# Patient Record
Sex: MALE | Race: WHITE | NOT HISPANIC OR LATINO | Employment: OTHER | URBAN - METROPOLITAN AREA
[De-identification: names, ages, dates, MRNs, and addresses within clinical notes are randomized per-mention and may not be internally consistent; named-entity substitution may affect disease eponyms.]

---

## 2021-11-17 ENCOUNTER — NURSE TRIAGE (OUTPATIENT)
Dept: NURSING | Facility: CLINIC | Age: 86
End: 2021-11-17
Payer: COMMERCIAL

## 2021-11-18 NOTE — TELEPHONE ENCOUNTER
"TRIAGE CALL     Granddarohit Manriquez calling -     He has urgency with urination   was seen yesterday and he does not have a UTI   then she said \"doctor was useless\"  Clinic and  is  [facility]      She tells this RN that He has a Dx of an enlarged Prostate and has been taken Flomax for years  Last [employer] his manufacture for flomax change, and now he is having urinary symptoms    Symptoms/concern started 4 days    Pt only urinating a few drops on a bed pan  He does not make it to the bathroom because is \"too frequent\" -  He does not drink much water to avoid going to the bathroom.     Per protocol, disposition to ER now   RN unsure if granddaughter will follow dispositions she does not want to wait for hrs.  Explained the dangers of waiting and lte her know that if he is \"too weak\" he she can call 911.    Shiloh Rolle RN Nurse Triage Advisor 8:54 PM 11/17/2021     Reason for Disposition    [1] Unable to urinate (or only a few drops) > 4 hours AND     [2] bladder feels very full (e.g., palpable bladder or strong urge to urinate)    Additional Information    Negative: Shock suspected (e.g., cold/pale/clammy skin, too weak to stand, low BP, rapid pulse)    Negative: Sounds like a life-threatening emergency to the triager    Protocols used: URINARY SYMPTOMS-A-AH      "

## 2021-11-26 ENCOUNTER — LAB REQUISITION (OUTPATIENT)
Dept: LAB | Facility: CLINIC | Age: 86
End: 2021-11-26

## 2021-11-26 DIAGNOSIS — D64.9 ANEMIA, UNSPECIFIED: ICD-10-CM

## 2021-11-30 ENCOUNTER — LAB REQUISITION (OUTPATIENT)
Dept: LAB | Facility: CLINIC | Age: 86
End: 2021-11-30
Payer: COMMERCIAL

## 2021-11-30 DIAGNOSIS — I10 ESSENTIAL (PRIMARY) HYPERTENSION: ICD-10-CM

## 2021-11-30 DIAGNOSIS — D64.9 ANEMIA, UNSPECIFIED: ICD-10-CM

## 2021-11-30 LAB
ANION GAP SERPL CALCULATED.3IONS-SCNC: 11 MMOL/L (ref 5–18)
BUN SERPL-MCNC: 28 MG/DL (ref 8–28)
CALCIUM SERPL-MCNC: 8.8 MG/DL (ref 8.5–10.5)
CHLORIDE BLD-SCNC: 107 MMOL/L (ref 98–107)
CO2 SERPL-SCNC: 21 MMOL/L (ref 22–31)
CREAT SERPL-MCNC: 1.04 MG/DL (ref 0.7–1.3)
GFR SERPL CREATININE-BSD FRML MDRD: 63 ML/MIN/1.73M2
GLUCOSE BLD-MCNC: 142 MG/DL (ref 70–125)
HGB BLD-MCNC: 10.1 G/DL (ref 13.3–17.7)
POTASSIUM BLD-SCNC: 3.5 MMOL/L (ref 3.5–5)
SODIUM SERPL-SCNC: 139 MMOL/L (ref 136–145)

## 2021-11-30 PROCEDURE — P9603 ONE-WAY ALLOW PRORATED MILES: HCPCS | Mod: ORL | Performed by: NURSE PRACTITIONER

## 2021-11-30 PROCEDURE — 80048 BASIC METABOLIC PNL TOTAL CA: CPT | Mod: ORL | Performed by: NURSE PRACTITIONER

## 2021-11-30 PROCEDURE — 36415 COLL VENOUS BLD VENIPUNCTURE: CPT | Mod: ORL | Performed by: NURSE PRACTITIONER

## 2021-11-30 PROCEDURE — 85018 HEMOGLOBIN: CPT | Mod: ORL | Performed by: NURSE PRACTITIONER

## 2024-05-21 ENCOUNTER — DOCUMENTATION ONLY (OUTPATIENT)
Dept: GERIATRICS | Facility: CLINIC | Age: 89
End: 2024-05-21
Payer: COMMERCIAL

## 2024-05-21 PROBLEM — C78.7 CHOLANGIOCARCINOMA METASTATIC TO LIVER (H): Status: ACTIVE | Noted: 2021-04-08

## 2024-05-21 PROBLEM — R06.02 SOB (SHORTNESS OF BREATH): Status: ACTIVE | Noted: 2024-05-11

## 2024-05-21 PROBLEM — J69.0 ASPIRATION PNEUMONIA OF RIGHT LOWER LOBE (H): Status: ACTIVE | Noted: 2024-05-13

## 2024-05-21 PROBLEM — S72.143A CLOSED INTERTROCHANTERIC FRACTURE OF FEMUR (H): Status: ACTIVE | Noted: 2021-11-19

## 2024-05-21 PROBLEM — I48.0 PAROXYSMAL ATRIAL FIBRILLATION (H): Status: ACTIVE | Noted: 2021-11-23

## 2024-05-21 PROBLEM — R15.9 URINARY AND FECAL INCONTINENCE: Status: ACTIVE | Noted: 2022-12-01

## 2024-05-21 PROBLEM — D64.9 SEVERE ANEMIA: Status: ACTIVE | Noted: 2024-05-10

## 2024-05-21 PROBLEM — R41.82 ALTERED MENTAL STATUS: Status: ACTIVE | Noted: 2024-05-11

## 2024-05-21 PROBLEM — R47.89 OTHER SPEECH DISTURBANCES: Status: ACTIVE | Noted: 2021-06-04

## 2024-05-21 PROBLEM — D50.9 IRON DEFICIENCY ANEMIA: Status: ACTIVE | Noted: 2023-11-11

## 2024-05-21 PROBLEM — E78.5 HYPERLIPIDEMIA, UNSPECIFIED: Status: ACTIVE | Noted: 2021-11-23
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
## 2024-05-21 PROBLEM — K43.2 INCISIONAL HERNIA, WITHOUT OBSTRUCTION OR GANGRENE: Status: ACTIVE | Noted: 2023-02-11

## 2024-05-21 PROBLEM — E78.5 DYSLIPIDEMIA: Status: ACTIVE | Noted: 2021-05-03

## 2024-05-21 PROBLEM — R20.2 PARESTHESIA OF RIGHT FOOT: Status: ACTIVE | Noted: 2022-11-23

## 2024-05-21 PROBLEM — M62.81 GENERALIZED MUSCLE WEAKNESS: Status: ACTIVE | Noted: 2022-12-01

## 2024-05-21 PROBLEM — R41.0 DISORIENTATION: Status: ACTIVE | Noted: 2022-12-01

## 2024-05-21 PROBLEM — I45.5 OTHER SPECIFIED HEART BLOCK: Status: ACTIVE | Noted: 2021-11-23

## 2024-05-21 PROBLEM — R52 PAIN, UNSPECIFIED: Status: ACTIVE | Noted: 2021-11-23

## 2024-05-21 PROBLEM — Q38.7: Status: ACTIVE | Noted: 2018-06-08

## 2024-05-21 PROBLEM — C22.1 CHOLANGIOCARCINOMA METASTATIC TO LIVER (H): Status: ACTIVE | Noted: 2021-04-08

## 2024-05-21 PROBLEM — R53.1 WEAKNESS: Status: ACTIVE | Noted: 2021-11-23

## 2024-05-21 PROBLEM — I45.5 SINUS PAUSE: Status: ACTIVE | Noted: 2021-08-29

## 2024-05-21 PROBLEM — R13.13 DYSPHAGIA, PHARYNGEAL PHASE: Status: ACTIVE | Noted: 2021-11-23

## 2024-05-21 PROBLEM — G93.41 METABOLIC ENCEPHALOPATHY: Status: ACTIVE | Noted: 2024-05-11

## 2024-05-21 PROBLEM — R13.13 DYSPHAGIA, CRICOPHARYNGEAL: Status: ACTIVE | Noted: 2018-05-25

## 2024-05-21 PROBLEM — R32 URINARY AND FECAL INCONTINENCE: Status: ACTIVE | Noted: 2022-12-01

## 2024-05-21 PROBLEM — R00.1 BRADYCARDIA: Status: ACTIVE | Noted: 2021-06-04

## 2024-05-21 PROBLEM — N40.0 BENIGN PROSTATIC HYPERPLASIA WITHOUT LOWER URINARY TRACT SYMPTOMS: Status: ACTIVE | Noted: 2021-05-03

## 2024-05-21 PROBLEM — J81.1 NON-CARDIOGENIC PULMONARY EDEMA: Status: ACTIVE | Noted: 2024-05-10

## 2024-05-21 PROBLEM — M79.604 ACUTE PAIN OF RIGHT LOWER EXTREMITY: Status: ACTIVE | Noted: 2023-11-10

## 2024-05-22 ENCOUNTER — NURSING HOME VISIT (OUTPATIENT)
Dept: GERIATRICS | Facility: CLINIC | Age: 89
End: 2024-05-22
Payer: COMMERCIAL

## 2024-05-22 VITALS
RESPIRATION RATE: 16 BRPM | HEIGHT: 64 IN | WEIGHT: 137.8 LBS | BODY MASS INDEX: 23.52 KG/M2 | OXYGEN SATURATION: 92 % | HEART RATE: 65 BPM | TEMPERATURE: 98.1 F | SYSTOLIC BLOOD PRESSURE: 155 MMHG | DIASTOLIC BLOOD PRESSURE: 69 MMHG

## 2024-05-22 DIAGNOSIS — M62.81 GENERALIZED MUSCLE WEAKNESS: ICD-10-CM

## 2024-05-22 DIAGNOSIS — I48.0 PAROXYSMAL ATRIAL FIBRILLATION (H): ICD-10-CM

## 2024-05-22 DIAGNOSIS — D64.9 ANEMIA, UNSPECIFIED TYPE: ICD-10-CM

## 2024-05-22 DIAGNOSIS — C78.7 CHOLANGIOCARCINOMA METASTATIC TO LIVER (H): Primary | ICD-10-CM

## 2024-05-22 DIAGNOSIS — R00.1 BRADYCARDIA: ICD-10-CM

## 2024-05-22 DIAGNOSIS — C22.1 CHOLANGIOCARCINOMA METASTATIC TO LIVER (H): Primary | ICD-10-CM

## 2024-05-22 DIAGNOSIS — I73.9 PERIPHERAL VASCULAR DISEASE (H): ICD-10-CM

## 2024-05-22 PROCEDURE — 99310 SBSQ NF CARE HIGH MDM 45: CPT | Performed by: NURSE PRACTITIONER

## 2024-05-22 RX ORDER — MECOBALAMIN 5000 MCG
15 TABLET,DISINTEGRATING ORAL DAILY
COMMUNITY
Start: 2024-05-22

## 2024-05-22 RX ORDER — FINASTERIDE 5 MG/1
5 TABLET, FILM COATED ORAL DAILY
COMMUNITY
Start: 2024-05-22

## 2024-05-22 RX ORDER — ONDANSETRON 4 MG/1
4 TABLET, ORALLY DISINTEGRATING ORAL EVERY 8 HOURS PRN
COMMUNITY
Start: 2024-05-22 | End: 2024-07-03

## 2024-05-22 RX ORDER — POLYETHYLENE GLYCOL 3350 17 G/17G
1 POWDER, FOR SOLUTION ORAL DAILY PRN
COMMUNITY
Start: 2024-05-22 | End: 2024-07-03

## 2024-05-22 RX ORDER — LIDOCAINE 4 G/G
1 PATCH TOPICAL EVERY 24 HOURS
COMMUNITY
Start: 2024-05-22 | End: 2024-05-23

## 2024-05-22 RX ORDER — FERROUS SULFATE 325(65) MG
325 TABLET ORAL DAILY
COMMUNITY
Start: 2024-05-22

## 2024-05-22 RX ORDER — SENNOSIDES 8.6 MG
1 TABLET ORAL 2 TIMES DAILY PRN
COMMUNITY
Start: 2024-05-22 | End: 2024-07-03

## 2024-05-22 RX ORDER — OXYCODONE HYDROCHLORIDE 5 MG/1
2.5 TABLET ORAL EVERY 4 HOURS PRN
COMMUNITY
Start: 2024-05-22 | End: 2024-05-23

## 2024-05-22 RX ORDER — TAMSULOSIN HYDROCHLORIDE 0.4 MG/1
0.8 CAPSULE ORAL DAILY
COMMUNITY
Start: 2024-05-22

## 2024-05-22 RX ORDER — CLOPIDOGREL BISULFATE 75 MG/1
75 TABLET ORAL DAILY
COMMUNITY
Start: 2024-05-22

## 2024-05-22 RX ORDER — AMLODIPINE BESYLATE 5 MG/1
5 TABLET ORAL DAILY
COMMUNITY
Start: 2024-05-22 | End: 2024-07-03

## 2024-05-22 RX ORDER — ACETAMINOPHEN 325 MG/1
650 TABLET ORAL EVERY 4 HOURS PRN
COMMUNITY
Start: 2024-05-22 | End: 2024-07-03

## 2024-05-22 NOTE — PROGRESS NOTES
[facility] GERIATRICS  Primary Care Provider & Clinic: Kelsy Tavera NP, [address] [facility] [address]  Chief Complaint   Patient presents with    [facility] F/U     [facility] 5/17/2024 - 5/21/2024     [place] Medical Record Number: [id number]  Place of Service Where Encounter Took Place: [facility] () [[id number]]    Bc Seth is a 89 year old (7/9/1932), admitted to the above facility from  [facility]. Hospital stay 5/17/24 through 5/21/24.    HPI:    Patient was treated at the hospital on 5/9 for aspiration pneumonia he was able to recover and go home.  But was weak and went back to the hospital where he stayed from 5/17 through 5/21.  His hospital stay was complicated by bradycardia in the 40s.  An order was placed for an outpatient Zio patch x 14 days.  Goals of care were discussed and hospice services were offered.  He was also started on oxycodone and lidocaine patches as needed for pain.  Nurse reports that patient's granddaughter does not want him to have the oxycodone due to increased falls and confusion.    Facility EHR reviewed including bm record, progress notes, vital signs and MAR. Hospital discharge notes reviewed for last two admissions.     CODE STATUS/ADVANCE DIRECTIVES DISCUSSION: DNR/DNI  Patient's living condition: lives in a skilled nursing facility  ALLERGIES: No Known Allergies   PAST MEDICAL HISTORY: No past medical history on file.   PAST SURGICAL HISTORY:  has a past surgical history that includes IR Abdominal Aortogram (11/11/2023).  FAMILY HISTORY: family history is not on file.  SOCIAL HISTORY:      Post Discharge Medication Reconciliation Status:  MED REC REQUIRED  Post Medication Reconciliation Status:       Current Outpatient Medications   Medication Sig Dispense Refill    acetaminophen (TYLENOL) 325 MG tablet Take 650 mg by mouth every 4 hours as needed for pain      amLODIPine (NORVASC) 5 MG  "tablet Take 5 mg by mouth daily      clopidogrel (PLAVIX) 75 MG tablet Take 75 mg by mouth daily      ferrous sulfate (FEROSUL) 325 (65 Fe) MG tablet Take 325 mg by mouth daily      finasteride (PROSCAR) 5 MG tablet Take 5 mg by mouth daily      LANsoprazole (PREVACID) 15 MG DR capsule Take 15 mg by mouth daily      ondansetron (ZOFRAN ODT) 4 MG ODT tab Take 4 mg by mouth every 8 hours as needed for nausea or vomiting      polyethylene glycol (MIRALAX) 17 g packet Take 1 packet by mouth daily as needed for constipation      sennosides (SENOKOT) 8.6 MG tablet Take 1 tablet by mouth 2 times daily as needed for constipation      tamsulosin (FLOMAX) 0.4 MG capsule Take 0.8 mg by mouth daily       No current facility-administered medications for this visit.     ROS:  4 point ROS including Respiratory, CV, GI and , other than that noted in the HPI,  is negative    Vitals:  BP (!) 155/69   Pulse 65   Temp 98.1  F (36.7  C)   Resp 16   Ht 1.626 m (5' 4\")   Wt 62.5 kg (137 lb 12.8 oz)   SpO2 92%   BMI 23.65 kg/m    Exam:  GENERAL APPEARANCE:  Alert, in no distress  RESP:  respiratory effort normal  CV:   edema none  M/S:  Gait and station ambulates independently with walker, no tenderness or swelling of the joints   SKIN:  Inspection and palpation of skin and subcutaneous tissue at baseline  PSYCH:  insight and judgement, memory FLAQUITO, affect and mood normal    Hgb 9.5    ASSESSMENT/PLAN:  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 No symptoms of pain. Discontinue oxycodone at family request.     Bradycardia  No fall or syncopal episodes since admit.   - ziopatch as ordered.     Paroxysmal atrial fibrillation (H)  Noted in hx. Not on any rate controller medications. On plavix for PVD.     Peripheral vascular disease (H24)  No current concerns. Continue plavix.     Anemia, unspecified type  Noted with hx of recent significant anemia down to " 6.9 in early march requiring blood transfusion.  - monitor PRN.       Orders:  -Discontinue lidocaine patch  Discontinue oxycodone    45 MINUTES SPENT BY ME on the date of service doing chart review, history, exam, documentation & further activities per the note.     Electronically signed by: Kelsy Tavera NP

## 2024-05-22 NOTE — LETTER
5/22/2024        RE: Bc Seth  [address]  [facility] [address]        [facility] GERIATRICS  Primary Care Provider & Clinic: Kelsy Tavera NP, [address] [facility] [address]  Chief Complaint   Patient presents with     [facility] F/U     [facility] 5/17/2024 - 5/21/2024     [place] Medical Record Number: [id number]  Place of Service Where Encounter Took Place: [facility] () [[id number]]    Bc Seth is a 89 year old (7/9/1932), admitted to the above facility from  [facility]. Hospital stay 5/17/24 through 5/21/24.    HPI:    Patient was treated at the hospital on 5/9 for aspiration pneumonia he was able to recover and go home.  But was weak and went back to the hospital where he stayed from 5/17 through 5/21.  His hospital stay was complicated by bradycardia in the 40s.  An order was placed for an outpatient Zio patch x 14 days.  Goals of care were discussed and hospice services were offered.  He was also started on oxycodone and lidocaine patches as needed for pain.  Nurse reports that patient's granddaughter does not want him to have the oxycodone due to increased falls and confusion.    CODE STATUS/ADVANCE DIRECTIVES DISCUSSION: DNR/DNI  Patient's living condition: lives in a skilled nursing facility  ALLERGIES: No Known Allergies   PAST MEDICAL HISTORY: No past medical history on file.   PAST SURGICAL HISTORY:  has a past surgical history that includes IR Abdominal Aortogram (11/11/2023).  FAMILY HISTORY: family history is not on file.  SOCIAL HISTORY:      Post Discharge Medication Reconciliation Status:  MED REC REQUIRED  Post Medication Reconciliation Status:       Current Outpatient Medications   Medication Sig Dispense Refill     acetaminophen (TYLENOL) 325 MG tablet Take 650 mg by mouth every 4 hours as needed for pain       amLODIPine (NORVASC) 5 MG tablet Take 5 mg by mouth daily       clopidogrel (PLAVIX)  "75 MG tablet Take 75 mg by mouth daily       ferrous sulfate (FEROSUL) 325 (65 Fe) MG tablet Take 325 mg by mouth daily       finasteride (PROSCAR) 5 MG tablet Take 5 mg by mouth daily       LANsoprazole (PREVACID) 15 MG DR capsule Take 15 mg by mouth daily       ondansetron (ZOFRAN ODT) 4 MG ODT tab Take 4 mg by mouth every 8 hours as needed for nausea or vomiting       polyethylene glycol (MIRALAX) 17 g packet Take 1 packet by mouth daily as needed for constipation       sennosides (SENOKOT) 8.6 MG tablet Take 1 tablet by mouth 2 times daily as needed for constipation       tamsulosin (FLOMAX) 0.4 MG capsule Take 0.8 mg by mouth daily       No current facility-administered medications for this visit.     ROS:  4 point ROS including Respiratory, CV, GI and , other than that noted in the HPI,  is negative    Vitals:  BP (!) 155/69   Pulse 65   Temp 98.1  F (36.7  C)   Resp 16   Ht 1.626 m (5' 4\")   Wt 62.5 kg (137 lb 12.8 oz)   SpO2 92%   BMI 23.65 kg/m    Exam:  GENERAL APPEARANCE:  Alert, in no distress  RESP:  respiratory effort normal  CV:   edema none  M/S:  Gait and station ambulates independently with walker, no tenderness or swelling of the joints   SKIN:  Inspection and palpation of skin and subcutaneous tissue at baseline  PSYCH:  insight and judgement, memory FLAQUITO, affect and mood normal    Hgb 9.5    ASSESSMENT/PLAN:  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 No symptoms of pain. Discontinue oxycodone at family request.     Bradycardia  No fall or syncopal episodes since admit.   - ziopatch as ordered.     Paroxysmal atrial fibrillation (H)  Noted in hx. Not on any rate controller medications. On plavix for PVD.     Peripheral vascular disease (H24)  No current concerns. Continue plavix.     Anemia, unspecified type  Noted with hx of recent significant anemia down to 6.9 in early march requiring blood transfusion.  - " monitor PRN.       Orders:  -Discontinue lidocaine patch  Discontinue oxycodone    45 MINUTES SPENT BY ME on the date of service doing chart review, history, exam, documentation & further activities per the note.     Electronically signed by: Kelsy Tavera NP      Sincerely,        Kelsy Tavera NP

## 2024-05-23 PROBLEM — D64.9 ANEMIA, UNSPECIFIED TYPE: Status: ACTIVE | Noted: 2024-05-23

## 2024-05-30 NOTE — PROGRESS NOTES
"[facility] GERIATRICS    PRIMARY CARE PROVIDER AND CLINIC:  Kelsy Tavera NP, [address] [facility] [address]  Chief Complaint   Patient presents with    [facility] Medical Record Number:  [id number]  Place of Service where encounter took place:  [facility] () [[id number]]    Bc Seth  is a 89 year old  (7/9/1932), admitted to the above facility from  [facility]. Hospital stay 5/17/24 through 5/21/24..   HPI:    As per GNP's HPI:\"Patient was treated at the hospital on 5/9 for aspiration pneumonia he was able to recover and go home.  But was weak and went back to the hospital where he stayed from 5/17 through 5/21.  His hospital stay was complicated by bradycardia in the 40s.  An order was placed for an outpatient Zio patch x 14 days.  Goals of care were discussed and hospice services were offered.  He was also started on oxycodone and lidocaine patches as needed for pain. Nurse reports that patient's granddaughter does not want him to have the oxycodone due to increased falls and confusion.\"      TODAY:  -Cholangiocarcinoma metastatic to liver: nursing reports tolerating meal, had a good breakfast, often skips lunch.   -Pain:no concern reported by the staff  - General weakness/rehab: requires belt gait for transfer. RN reports does interact and comes out of his room.   - was able to help him sit up and drink water, then went back to bed.   =============================================================================================  CODE STATUS/ADVANCE DIRECTIVES DISCUSSION:  No CPR- Pre-arrest intubation OK    ALLERGIES: No Known Allergies   PAST MEDICAL HISTORY: No past medical history on file.   PAST SURGICAL HISTORY:   has a past surgical history that includes IR Abdominal Aortogram (11/11/2023).  FAMILY HISTORY: family history is not on file.  SOCIAL HISTORY:     Patient's living condition: lives with family    Post Discharge Medication " "Reconciliation Status:   MED REC REQUIRED  Post Medication Reconciliation Status: discharge medications reconciled and changed, per note/orders       Current Outpatient Medications   Medication Sig Dispense Refill    acetaminophen (TYLENOL) 325 MG tablet Take 650 mg by mouth every 4 hours as needed for pain      amLODIPine (NORVASC) 5 MG tablet Take 5 mg by mouth daily      clopidogrel (PLAVIX) 75 MG tablet Take 75 mg by mouth daily      ferrous sulfate (FEROSUL) 325 (65 Fe) MG tablet Take 325 mg by mouth daily      finasteride (PROSCAR) 5 MG tablet Take 5 mg by mouth daily      LANsoprazole (PREVACID) 15 MG DR capsule Take 15 mg by mouth daily      ondansetron (ZOFRAN ODT) 4 MG ODT tab Take 4 mg by mouth every 8 hours as needed for nausea or vomiting      polyethylene glycol (MIRALAX) 17 g packet Take 1 packet by mouth daily as needed for constipation      sennosides (SENOKOT) 8.6 MG tablet Take 1 tablet by mouth 2 times daily as needed for constipation      tamsulosin (FLOMAX) 0.4 MG capsule Take 0.8 mg by mouth daily       No current facility-administered medications for this visit.       ROS:  10 point ROS of systems including Constitutional, Eyes, Respiratory, Cardiovascular, Gastroenterology, Genitourinary, Integumentary, Musculoskeletal, Psychiatric were all negative except for pertinent positives noted in my HPI.    Vitals:  BP (!) 151/73   Pulse 55   Temp 98.6  F (37  C)   Resp 18   Ht 1.626 m (5' 4\")   Wt 58.2 kg (128 lb 4.8 oz)   SpO2 95%   BMI 22.02 kg/m    Exam:  GENERAL APPEARANCE:  in no distress, lying in the bed comfortable. Was able to sit up with my help to drink water, then went back to lay down. Offered him a sweet that was next to him, and ate a small piece only.   RESP:  Unlabored breathing. CTA b/l.   CV:  S1S2 audible, regular HR, no murmur appreciated.   ABDOMEN:  soft, NT/ND, BS audible.   M/S:   no joint deformity noted on observation.   SKIN:  No rash noted on observation  NEURO:  " " No NFD appreciated on observation.       Lab/Diagnostic data: Reviewed in the chart and EHR.      ASSESSMENT/PLAN:  =================  Cholangiocarcinoma metastatic to liver (H)  -\"Liver mets s/p partial resection (2021)  Status post Y90 radio embolization of the hepatic segment 4A lesion in May 2022, followed by Y90 radioembolization of the hepatic segment 7 lesion and stereotactic radiation. \"  - oncology /palliative care teams follow up    Primary hypertension  Bradycardia  - BP within acceptable range.   - not on rate control      Iron deficiency anemia, unspecified iron deficiency anemia type  - on supplement. continue    Slow transit constipation  - on meds. No concern, continue.     Benign prostatic hyperplasia without lower urinary tract symptoms  On flomax and finasteride. continue    Generalized muscle weakness  - started rehab program.       Orders:  NNO    Electronically signed by:  Gracia Khan MD                 "

## 2024-06-03 ENCOUNTER — NURSING HOME VISIT (OUTPATIENT)
Dept: GERIATRICS | Facility: CLINIC | Age: 89
End: 2024-06-03
Payer: COMMERCIAL

## 2024-06-03 VITALS
DIASTOLIC BLOOD PRESSURE: 73 MMHG | TEMPERATURE: 98.6 F | HEART RATE: 55 BPM | HEIGHT: 64 IN | BODY MASS INDEX: 21.91 KG/M2 | OXYGEN SATURATION: 95 % | RESPIRATION RATE: 18 BRPM | WEIGHT: 128.3 LBS | SYSTOLIC BLOOD PRESSURE: 151 MMHG

## 2024-06-03 DIAGNOSIS — I10 PRIMARY HYPERTENSION: ICD-10-CM

## 2024-06-03 DIAGNOSIS — N40.0 BENIGN PROSTATIC HYPERPLASIA WITHOUT LOWER URINARY TRACT SYMPTOMS: ICD-10-CM

## 2024-06-03 DIAGNOSIS — D50.9 IRON DEFICIENCY ANEMIA, UNSPECIFIED IRON DEFICIENCY ANEMIA TYPE: ICD-10-CM

## 2024-06-03 DIAGNOSIS — R00.1 BRADYCARDIA: ICD-10-CM

## 2024-06-03 DIAGNOSIS — K59.01 SLOW TRANSIT CONSTIPATION: ICD-10-CM

## 2024-06-03 DIAGNOSIS — M62.81 GENERALIZED MUSCLE WEAKNESS: ICD-10-CM

## 2024-06-03 DIAGNOSIS — C78.7 CHOLANGIOCARCINOMA METASTATIC TO LIVER (H): Primary | ICD-10-CM

## 2024-06-03 DIAGNOSIS — C22.1 CHOLANGIOCARCINOMA METASTATIC TO LIVER (H): Primary | ICD-10-CM

## 2024-06-03 PROCEDURE — 99305 1ST NF CARE MODERATE MDM 35: CPT | Performed by: FAMILY MEDICINE

## 2024-06-03 NOTE — LETTER
"    6/3/2024        RE: Bc Seth  [address]  [facility] [address]        [facility] GERIATRICS    PRIMARY CARE PROVIDER AND CLINIC:  Kelsy Tavera NP, [address]  / [facility] [address]  Chief Complaint   Patient presents with     [facility] Medical Record Number:  [id number]  Place of Service where encounter took place:  [facility] () [[id number]]    Bc Seth  is a 89 year old  (7/9/1932), admitted to the above facility from  [facility]. Hospital stay 5/17/24 through 5/21/24..   HPI:    As per GNP's HPI:\"Patient was treated at the hospital on 5/9 for aspiration pneumonia he was able to recover and go home.  But was weak and went back to the hospital where he stayed from 5/17 through 5/21.  His hospital stay was complicated by bradycardia in the 40s.  An order was placed for an outpatient Zio patch x 14 days.  Goals of care were discussed and hospice services were offered.  He was also started on oxycodone and lidocaine patches as needed for pain. Nurse reports that patient's granddaughter does not want him to have the oxycodone due to increased falls and confusion.\"      TODAY:  -Cholangiocarcinoma metastatic to liver: nursing reports tolerating meal, had a good breakfast, often skips lunch.   -Pain:no concern reported by the staff  - General weakness/rehab: requires belt gait for transfer. RN reports does interact and comes out of his room.   - was able to help him sit up and drink water, then went back to bed.   =============================================================================================  CODE STATUS/ADVANCE DIRECTIVES DISCUSSION:  No CPR- Pre-arrest intubation OK    ALLERGIES: No Known Allergies   PAST MEDICAL HISTORY: No past medical history on file.   PAST SURGICAL HISTORY:   has a past surgical history that includes IR Abdominal Aortogram (11/11/2023).  FAMILY HISTORY: family history is not on file.  SOCIAL " "HISTORY:     Patient's living condition: lives with family    Post Discharge Medication Reconciliation Status:   MED REC REQUIRED  Post Medication Reconciliation Status: discharge medications reconciled and changed, per note/orders       Current Outpatient Medications   Medication Sig Dispense Refill     acetaminophen (TYLENOL) 325 MG tablet Take 650 mg by mouth every 4 hours as needed for pain       amLODIPine (NORVASC) 5 MG tablet Take 5 mg by mouth daily       clopidogrel (PLAVIX) 75 MG tablet Take 75 mg by mouth daily       ferrous sulfate (FEROSUL) 325 (65 Fe) MG tablet Take 325 mg by mouth daily       finasteride (PROSCAR) 5 MG tablet Take 5 mg by mouth daily       LANsoprazole (PREVACID) 15 MG DR capsule Take 15 mg by mouth daily       ondansetron (ZOFRAN ODT) 4 MG ODT tab Take 4 mg by mouth every 8 hours as needed for nausea or vomiting       polyethylene glycol (MIRALAX) 17 g packet Take 1 packet by mouth daily as needed for constipation       sennosides (SENOKOT) 8.6 MG tablet Take 1 tablet by mouth 2 times daily as needed for constipation       tamsulosin (FLOMAX) 0.4 MG capsule Take 0.8 mg by mouth daily       No current facility-administered medications for this visit.       ROS:  10 point ROS of systems including Constitutional, Eyes, Respiratory, Cardiovascular, Gastroenterology, Genitourinary, Integumentary, Musculoskeletal, Psychiatric were all negative except for pertinent positives noted in my HPI.    Vitals:  BP (!) 151/73   Pulse 55   Temp 98.6  F (37  C)   Resp 18   Ht 1.626 m (5' 4\")   Wt 58.2 kg (128 lb 4.8 oz)   SpO2 95%   BMI 22.02 kg/m    Exam:  GENERAL APPEARANCE:  in no distress, lying in the bed comfortable. Was able to sit up with my help to drink water, then went back to lay down. Offered him a sweet that was next to him, and ate a small piece only.   RESP:  Unlabored breathing. CTA b/l.   CV:  S1S2 audible, regular HR, no murmur appreciated.   ABDOMEN:  soft, NT/ND, BS " "audible.   M/S:   no joint deformity noted on observation.   SKIN:  No rash noted on observation  NEURO:   No NFD appreciated on observation.       Lab/Diagnostic data: Reviewed in the chart and EHR.      ASSESSMENT/PLAN:  =================  Cholangiocarcinoma metastatic to liver (H)  -\"Liver mets s/p partial resection (2021)  Status post Y90 radio embolization of the hepatic segment 4A lesion in May 2022, followed by Y90 radioembolization of the hepatic segment 7 lesion and stereotactic radiation. \"  - oncology /palliative care teams follow up    Primary hypertension  Bradycardia  - BP within acceptable range.   - not on rate control      Iron deficiency anemia, unspecified iron deficiency anemia type  - on supplement. continue    Slow transit constipation  - on meds. No concern, continue.     Benign prostatic hyperplasia without lower urinary tract symptoms  On flomax and finasteride. continue    Generalized muscle weakness  - started rehab program.       Orders:  NNO    Electronically signed by:  Gracia Khan MD                     Sincerely,        Gracia Khan MD      "

## 2024-06-06 ENCOUNTER — NURSING HOME VISIT (OUTPATIENT)
Dept: GERIATRICS | Facility: CLINIC | Age: 89
End: 2024-06-06
Payer: COMMERCIAL

## 2024-06-06 VITALS
RESPIRATION RATE: 16 BRPM | BODY MASS INDEX: 22.73 KG/M2 | WEIGHT: 128.3 LBS | TEMPERATURE: 98.4 F | SYSTOLIC BLOOD PRESSURE: 123 MMHG | HEART RATE: 63 BPM | DIASTOLIC BLOOD PRESSURE: 64 MMHG | OXYGEN SATURATION: 97 % | HEIGHT: 63 IN

## 2024-06-06 DIAGNOSIS — B37.0 THRUSH: Primary | ICD-10-CM

## 2024-06-06 PROCEDURE — 99308 SBSQ NF CARE LOW MDM 20: CPT | Performed by: NURSE PRACTITIONER

## 2024-06-06 NOTE — LETTER
" 6/6/2024      Bc Seth  [address]  [facility] [address]        [facility]  Acute visit    Chief Complaint   Patient presents with     RECHECK     HPI:  Bc Seth is a 89 year old  (7/9/1932), who is being seen today for an episodic care visit at: [facility] () [[id number]]. Today's concern is:   Thrush    Nurse reports coated tongue. Tried increase in oral care.     ROS:  Limited due to non english speaking. Communication board used. Denies pain.     Vitals:  /64   Pulse 63   Temp 98.4  F (36.9  C)   Resp 16   Ht 1.6 m (5' 3\")   Wt 58.2 kg (128 lb 4.8 oz)   SpO2 97%   BMI 22.73 kg/m    Exam:  General: alert. In no distress. Black, yellow coated tongue.    ASSESSMENT/PLAN:    ICD-10-CM    1. Thrush  B37.0            Orders:  - nystatin 100,000 units solution. Swish and spit 20 ml QID x 7 days.      Electronically signed by: Kelsy Tavera NP       Sincerely,        Kelsy Tavera NP      "

## 2024-06-06 NOTE — PROGRESS NOTES
"[facility] GERIATRIC SERVICES  Acute visit    Chief Complaint   Patient presents with    RECHECK     HPI:  Bc Seth is a 89 year old  (7/9/1932), who is being seen today for an episodic care visit at: [facility] () [[id number]]. Today's concern is:   Thrush    Nurse reports coated tongue. Tried increase in oral care.     ROS:  Limited due to non english speaking. Communication board used. Denies pain.     Vitals:  /64   Pulse 63   Temp 98.4  F (36.9  C)   Resp 16   Ht 1.6 m (5' 3\")   Wt 58.2 kg (128 lb 4.8 oz)   SpO2 97%   BMI 22.73 kg/m    Exam:  General: alert. In no distress. Black, yellow coated tongue.    ASSESSMENT/PLAN:    ICD-10-CM    1. Thrush  B37.0            Orders:  - nystatin 100,000 units solution. Swish and spit 20 ml QID x 7 days.      Electronically signed by: Kelsy Tavera NP     "

## 2024-07-03 ENCOUNTER — NURSING HOME VISIT (OUTPATIENT)
Dept: GERIATRICS | Facility: CLINIC | Age: 89
End: 2024-07-03
Payer: COMMERCIAL

## 2024-07-03 VITALS
HEART RATE: 88 BPM | WEIGHT: 131 LBS | SYSTOLIC BLOOD PRESSURE: 100 MMHG | OXYGEN SATURATION: 98 % | TEMPERATURE: 97.9 F | RESPIRATION RATE: 16 BRPM | HEIGHT: 63 IN | BODY MASS INDEX: 23.21 KG/M2 | DIASTOLIC BLOOD PRESSURE: 59 MMHG

## 2024-07-03 DIAGNOSIS — D50.9 IRON DEFICIENCY ANEMIA, UNSPECIFIED IRON DEFICIENCY ANEMIA TYPE: ICD-10-CM

## 2024-07-03 DIAGNOSIS — I10 PRIMARY HYPERTENSION: ICD-10-CM

## 2024-07-03 DIAGNOSIS — C78.7 CHOLANGIOCARCINOMA METASTATIC TO LIVER (H): Primary | ICD-10-CM

## 2024-07-03 DIAGNOSIS — K59.01 SLOW TRANSIT CONSTIPATION: ICD-10-CM

## 2024-07-03 DIAGNOSIS — N40.0 BENIGN PROSTATIC HYPERPLASIA WITHOUT LOWER URINARY TRACT SYMPTOMS: ICD-10-CM

## 2024-07-03 DIAGNOSIS — C22.1 CHOLANGIOCARCINOMA METASTATIC TO LIVER (H): Primary | ICD-10-CM

## 2024-07-03 DIAGNOSIS — I48.0 PAROXYSMAL ATRIAL FIBRILLATION (H): ICD-10-CM

## 2024-07-03 PROBLEM — G93.41 METABOLIC ENCEPHALOPATHY: Status: RESOLVED | Noted: 2024-05-11 | Resolved: 2024-07-03

## 2024-07-03 PROBLEM — M79.604 ACUTE PAIN OF RIGHT LOWER EXTREMITY: Status: RESOLVED | Noted: 2023-11-10 | Resolved: 2024-07-03

## 2024-07-03 PROBLEM — R06.02 SOB (SHORTNESS OF BREATH): Status: RESOLVED | Noted: 2024-05-11 | Resolved: 2024-07-03

## 2024-07-03 PROBLEM — S72.143A CLOSED INTERTROCHANTERIC FRACTURE OF FEMUR (H): Status: RESOLVED | Noted: 2021-11-19 | Resolved: 2024-07-03

## 2024-07-03 PROBLEM — N39.0 URINARY TRACT INFECTION, SITE NOT SPECIFIED: Status: ACTIVE | Noted: 2021-11-23

## 2024-07-03 PROCEDURE — 99309 SBSQ NF CARE MODERATE MDM 30: CPT | Performed by: NURSE PRACTITIONER

## 2024-07-03 NOTE — PROGRESS NOTES
[facility] GERIATRICS  Chief Complaint   Patient presents with    [facility]     Place of Service where encounter took place:  [facility] () [[id number]]    HPI:    Bc Seth  is 89 year old (7/9/1932), who is being seen today at the above facility for a federally mandated E/M visit. 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  Nursing has no concerns.  Patient is going out with his daughter for an overnight visit.  She plans on taking him home at some point.  She would like his amlodipine discontinued.  Facility EHR reviewed including progress notes, vital signs, care plan and MAR.  Weight is up to 131 pounds from 126 pounds.  Blood pressure 100/59-1 45/57.  Pulse 61-88.  He is on a regular diet with a house nutritional supplement.  PHQ-9 2  BIMS unable  Function: Ambulates with assist and walker  Nutrition: He is on a regular diet with a house nutritional supplement.    Medication reconciled between EHR's. Problems list reviewed and updated.     ALLERGIES:Patient has no known allergies.  Patient Active Problem List    Diagnosis Date Noted    Anemia, unspecified type 05/23/2024     Priority: Medium    Aspiration pneumonia of right lower lobe (H) 05/13/2024     Priority: Medium    Altered mental status 05/11/2024     Priority: Medium    Severe anemia 05/10/2024     Priority: Medium    Non-cardiogenic pulmonary edema 05/10/2024     Priority: Medium    Iron deficiency anemia 11/11/2023     Priority: Medium    Incisional hernia, without obstruction or gangrene 02/11/2023     Priority: Medium     Formatting of this note might be different from the original.   epigastrium      Disorientation 12/01/2022      Priority: Medium    Generalized muscle weakness 12/01/2022     Priority: Medium    Urinary and fecal incontinence 12/01/2022     Priority: Medium    Paresthesia of right foot 11/23/2022     Priority: Medium    Hyperlipidemia, unspecified 11/23/2021     Priority: Medium    Dysphagia, pharyngeal phase 11/23/2021     Priority: Medium    Paroxysmal atrial fibrillation (H) 11/23/2021     Priority: Medium    Pain, unspecified 11/23/2021     Priority: Medium    Other specified heart block 11/23/2021     Priority: Medium    Weakness 11/23/2021     Priority: Medium    Intrahepatic bile duct carcinoma (H) 11/23/2021     Priority: Medium    Urinary tract infection, site not specified 11/23/2021     Priority: Medium    Fall 08/29/2021     Priority: Medium    Sinus pause 08/29/2021     Priority: Medium    Bradycardia 06/04/2021     Priority: Medium    Other speech disturbances 06/04/2021     Priority: Medium    Benign prostatic hyperplasia without lower urinary tract symptoms 05/03/2021     Priority: Medium    Dyslipidemia 05/03/2021     Priority: Medium    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   Formatting of this note might be different from the original.    Epic  Formatting of this note might be different from the original.   Epic      Peripheral vascular disease (H24) 02/06/2004     Priority: Medium     Formatting of this note might be different from the original.   Epic         MEDICATIONS:  Current Outpatient  "Medications   Medication Sig Dispense Refill    clopidogrel (PLAVIX) 75 MG tablet Take 75 mg by mouth daily      ferrous sulfate (FEROSUL) 325 (65 Fe) MG tablet Take 325 mg by mouth daily      finasteride (PROSCAR) 5 MG tablet Take 5 mg by mouth daily      LANsoprazole (PREVACID) 15 MG DR capsule Take 15 mg by mouth daily      tamsulosin (FLOMAX) 0.4 MG capsule Take 0.8 mg by mouth daily       No current facility-administered medications for this visit.       ROS:  4 point ROS including Respiratory, CV, GI and , other than that noted in the HPI,  is negative    Vitals:  /59   Pulse 88   Temp 97.9  F (36.6  C)   Resp 16   Ht 1.6 m (5' 3\")   Wt 59.4 kg (131 lb)   SpO2 98%   BMI 23.21 kg/m    Body mass index is 23.21 kg/m .  Exam:  GENERAL APPEARANCE:  Alert, in no distress  RESP:  respiratory effort and palpation of chest normal, no respiratory distress, lungs sounds clear  CV:  Palpation and auscultation of heart done , regular rate and rhythm, edema none  ABDOMEN:  normal bowel sounds, soft, nontender.  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  Continue comfort focused care.    Primary hypertension  Blood pressure is lower than needed.  Discontinue Norvasc    Iron deficiency anemia, unspecified iron deficiency anemia type  Continues on iron supplement.  No current concerns.     Slow transit constipation  Discontinue as needed the MiraLAX and senna due to no use.    Benign prostatic hyperplasia without lower urinary tract symptoms  Continues on finasteride and tamsulosin.    Paroxysmal atrial fibrillation (H)  Recently had Zio patch x 14 days.  Follow-up on results.    Orders:  -Discontinue amlodipine  Discontinue " acetaminophen, Zofran, MiraLAX, senna.    Electronically signed by: Kelsy Tavera NP

## 2024-07-03 NOTE — LETTER
7/3/2024      Bc Seth  [address]  [facility] [address]        [facility] GERIATRICS  Chief Complaint   Patient presents with     skilled nursing Regulatory     Place of Service where encounter took place:  [facility] () [[id number]]    HPI:    Bc Seth  is 89 year old (7/9/1932), who is being seen today at the above facility for a federally mandated E/M visit. 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  Nursing has no concerns.  Patient is going out with his daughter for an overnight visit.  She plans on taking him home at some point.  She would like his amlodipine discontinued.  Facility EHR reviewed including progress notes, vital signs, care plan and MAR.  Weight is up to 131 pounds from 126 pounds.  Blood pressure 100/59-1 45/57.  Pulse 61-88.  He is on a regular diet with a house nutritional supplement.  PHQ-9 2  BIMS unable  Function: Ambulates with assist and walker  Nutrition: He is on a regular diet with a house nutritional supplement.    Medication reconciled between EHR's. Problems list reviewed and updated.     ALLERGIES:Patient has no known allergies.  Patient Active Problem List    Diagnosis Date Noted     Anemia, unspecified type 05/23/2024     Priority: Medium     Aspiration pneumonia of right lower lobe (H) 05/13/2024     Priority: Medium     Altered mental status 05/11/2024     Priority: Medium     Severe anemia 05/10/2024     Priority: Medium     Non-cardiogenic pulmonary edema 05/10/2024     Priority: Medium     Iron deficiency anemia 11/11/2023     Priority: Medium     Incisional hernia, without obstruction or gangrene 02/11/2023     Priority: Medium     Formatting of this note might  be different from the original.   epigastrium       Disorientation 12/01/2022     Priority: Medium     Generalized muscle weakness 12/01/2022     Priority: Medium     Urinary and fecal incontinence 12/01/2022     Priority: Medium     Paresthesia of right foot 11/23/2022     Priority: Medium     Hyperlipidemia, unspecified 11/23/2021     Priority: Medium     Dysphagia, pharyngeal phase 11/23/2021     Priority: Medium     Paroxysmal atrial fibrillation (H) 11/23/2021     Priority: Medium     Pain, unspecified 11/23/2021     Priority: Medium     Other specified heart block 11/23/2021     Priority: Medium     Weakness 11/23/2021     Priority: Medium     Intrahepatic bile duct carcinoma (H) 11/23/2021     Priority: Medium     Urinary tract infection, site not specified 11/23/2021     Priority: Medium     Fall 08/29/2021     Priority: Medium     Sinus pause 08/29/2021     Priority: Medium     Bradycardia 06/04/2021     Priority: Medium     Other speech disturbances 06/04/2021     Priority: Medium     Benign prostatic hyperplasia without lower urinary tract symptoms 05/03/2021     Priority: Medium     Dyslipidemia 05/03/2021     Priority: Medium     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   Formatting of this note might be different from the original.    Epic  Formatting of this note might be different from the original.   Epic       Peripheral vascular disease (H24) 02/06/2004     Priority: Medium      "Formatting of this note might be different from the original.   Epic         MEDICATIONS:  Current Outpatient Medications   Medication Sig Dispense Refill     clopidogrel (PLAVIX) 75 MG tablet Take 75 mg by mouth daily       ferrous sulfate (FEROSUL) 325 (65 Fe) MG tablet Take 325 mg by mouth daily       finasteride (PROSCAR) 5 MG tablet Take 5 mg by mouth daily       LANsoprazole (PREVACID) 15 MG DR capsule Take 15 mg by mouth daily       tamsulosin (FLOMAX) 0.4 MG capsule Take 0.8 mg by mouth daily       No current facility-administered medications for this visit.       ROS:  4 point ROS including Respiratory, CV, GI and , other than that noted in the HPI,  is negative    Vitals:  /59   Pulse 88   Temp 97.9  F (36.6  C)   Resp 16   Ht 1.6 m (5' 3\")   Wt 59.4 kg (131 lb)   SpO2 98%   BMI 23.21 kg/m    Body mass index is 23.21 kg/m .  Exam:  GENERAL APPEARANCE:  Alert, in no distress  RESP:  respiratory effort and palpation of chest normal, no respiratory distress, lungs sounds clear  CV:  Palpation and auscultation of heart done , regular rate and rhythm, edema none  ABDOMEN:  normal bowel sounds, soft, nontender.  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  Continue comfort focused care.    Primary hypertension  Blood pressure is lower than needed.  Discontinue Norvasc    Iron deficiency anemia, unspecified iron deficiency anemia type  Continues on iron supplement.  No current concerns.     Slow transit constipation  Discontinue as needed the MiraLAX and senna due to no use.    Benign prostatic hyperplasia without lower urinary tract symptoms  Continues on finasteride and tamsulosin.    Paroxysmal atrial " fibrillation (H)  Recently had Zio patch x 14 days.  Follow-up on results.    Orders:  -Discontinue amlodipine  Discontinue acetaminophen, Zofran, MiraLAX, senna.    Electronically signed by: Kelsy Tavera NP      Sincerely,        Kelsy Tavera NP

## 2024-07-08 ENCOUNTER — DISCHARGE SUMMARY NURSING HOME (OUTPATIENT)
Dept: GERIATRICS | Facility: CLINIC | Age: 89
End: 2024-07-08
Payer: COMMERCIAL

## 2024-07-08 VITALS
RESPIRATION RATE: 16 BRPM | OXYGEN SATURATION: 98 % | HEIGHT: 63 IN | WEIGHT: 131 LBS | BODY MASS INDEX: 23.21 KG/M2 | DIASTOLIC BLOOD PRESSURE: 59 MMHG | SYSTOLIC BLOOD PRESSURE: 100 MMHG | HEART RATE: 88 BPM | TEMPERATURE: 97.9 F

## 2024-07-08 DIAGNOSIS — C78.7 CHOLANGIOCARCINOMA METASTATIC TO LIVER (H): Primary | ICD-10-CM

## 2024-07-08 DIAGNOSIS — K59.01 SLOW TRANSIT CONSTIPATION: ICD-10-CM

## 2024-07-08 DIAGNOSIS — N40.0 BENIGN PROSTATIC HYPERPLASIA WITHOUT LOWER URINARY TRACT SYMPTOMS: ICD-10-CM

## 2024-07-08 DIAGNOSIS — I10 PRIMARY HYPERTENSION: ICD-10-CM

## 2024-07-08 DIAGNOSIS — D50.9 IRON DEFICIENCY ANEMIA, UNSPECIFIED IRON DEFICIENCY ANEMIA TYPE: ICD-10-CM

## 2024-07-08 DIAGNOSIS — C22.1 CHOLANGIOCARCINOMA METASTATIC TO LIVER (H): Primary | ICD-10-CM

## 2024-07-08 DIAGNOSIS — I48.0 PAROXYSMAL ATRIAL FIBRILLATION (H): ICD-10-CM

## 2024-07-08 PROCEDURE — 99207 PR NO CHARGE LOS: CPT | Performed by: NURSE PRACTITIONER

## 2024-07-08 NOTE — LETTER
2024      Bc Seth  [address]  [facility] [address]        [facility] GERIATRICS TELEPHONE ENCOUNTER    Name/: Bc Seth (1932)  Patient went home on an WENDY and did well. Family is discharging him to their home but will not be at the facility until this evening.     ASSESSMENT/PLAN  - ok to discontinue to home with family.     Signed: Kelsy Tavera NP        Sincerely,        Kelsy Tavera NP

## 2024-07-09 NOTE — PROGRESS NOTES
[facility] GERIATRICS TELEPHONE ENCOUNTER    Name/: Bc Seth (1932)  Patient went home on an WENDY and did well. Family is discharging him to their home but will not be at the facility until this evening.     ASSESSMENT/PLAN  - ok to discontinue to home with family.     Signed: Kelsy Tavera NP

## 2025-01-10 PROBLEM — N40.1 BENIGN PROSTATIC HYPERPLASIA WITH WEAK URINARY STREAM: Status: ACTIVE | Noted: 2024-10-10

## 2025-01-10 PROBLEM — U07.1 COVID-19 VIRUS INFECTION: Status: ACTIVE | Noted: 2025-01-05

## 2025-01-10 PROBLEM — M79.89 BILATERAL SWELLING OF FEET: Status: ACTIVE | Noted: 2024-10-10

## 2025-01-10 PROBLEM — R39.12 BENIGN PROSTATIC HYPERPLASIA WITH WEAK URINARY STREAM: Status: ACTIVE | Noted: 2024-10-10

## 2025-01-10 PROBLEM — J44.9 CHRONIC OBSTRUCTIVE PULMONARY DISEASE (H): Status: ACTIVE | Noted: 2024-10-11

## 2025-01-10 PROBLEM — R79.89 LFT ELEVATION: Status: ACTIVE | Noted: 2024-10-10

## 2025-01-13 ENCOUNTER — NURSING HOME VISIT (OUTPATIENT)
Dept: GERIATRICS | Facility: CLINIC | Age: OVER 89
End: 2025-01-13
Payer: COMMERCIAL

## 2025-01-13 VITALS
TEMPERATURE: 98 F | BODY MASS INDEX: 23.92 KG/M2 | DIASTOLIC BLOOD PRESSURE: 68 MMHG | OXYGEN SATURATION: 93 % | HEIGHT: 63 IN | HEART RATE: 86 BPM | WEIGHT: 135 LBS | SYSTOLIC BLOOD PRESSURE: 142 MMHG | RESPIRATION RATE: 16 BRPM

## 2025-01-13 DIAGNOSIS — C22.1 CHOLANGIOCARCINOMA METASTATIC TO LIVER (H): ICD-10-CM

## 2025-01-13 DIAGNOSIS — M62.81 GENERALIZED MUSCLE WEAKNESS: ICD-10-CM

## 2025-01-13 DIAGNOSIS — C78.7 CHOLANGIOCARCINOMA METASTATIC TO LIVER (H): ICD-10-CM

## 2025-01-13 DIAGNOSIS — I48.0 PAROXYSMAL ATRIAL FIBRILLATION (H): ICD-10-CM

## 2025-01-13 DIAGNOSIS — I10 PRIMARY HYPERTENSION: Primary | ICD-10-CM

## 2025-01-13 PROCEDURE — 99310 SBSQ NF CARE HIGH MDM 45: CPT | Performed by: NURSE PRACTITIONER

## 2025-01-13 RX ORDER — OMEPRAZOLE 20 MG/1
20 TABLET, DELAYED RELEASE ORAL DAILY
COMMUNITY
Start: 2025-01-13

## 2025-01-13 RX ORDER — LISINOPRIL 5 MG/1
10 TABLET ORAL DAILY
COMMUNITY
Start: 2025-01-13

## 2025-01-13 RX ORDER — FERROUS GLUCONATE 324(38)MG
324 TABLET ORAL EVERY OTHER DAY
COMMUNITY
Start: 2025-01-13

## 2025-01-13 NOTE — LETTER
1/13/2025      Bc Seth  [address]  [facility] [address]        [facility] GERIATRICS  Primary Care Provider & Clinic: Kelsy Tavera NP, [address] [facility] [address]  Chief Complaint   Patient presents with     [facility] F/U     [facility] 1/4/2025 - 1/8/2025     [facility] Medical Record Number: [id number]  Place of Service Where Encounter Took Place: [facility] () [[id number]]    Bc Seth is a 89 year old (7/9/1932), admitted to the above facility from  [facility] . Hospital stay 1/4/25 through 1/8/25.    HPI:    Admitted to long-term [facility] for nursing care.  Patient recently hospitalized for COVID and weakness.  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  For his hypertension the lisinopril was increased during the hospital stay and an order was placed for a recheck of his BMP.  POLST reviewed and signed for DNR/DNI    Reviewed hospital discharge summary, facility EHR including progress notes, orders, MAR, vital signs.    Today he has no concerns.  Nursing reports no concerns.  He was evaluated by therapy and he has cognitive impairments that impair his ability to make safe decisions.  Discussed with nursing and therapy.      CODE STATUS/ADVANCE DIRECTIVES DISCUSSION: No CPR- Do NOT Intubate - DNR / DNI  Patient's living condition: lives in a skilled nursing facility  ALLERGIES: No Known Allergies   PAST MEDICAL HISTORY: No past medical history on file.   PAST SURGICAL HISTORY:  has a past surgical history that includes IR Abdominal Aortogram (11/11/2023).  FAMILY HISTORY: family history is not on file.  SOCIAL HISTORY:      Post Discharge Medication Reconciliation Status:  MED REC REQUIRED  Post Medication Reconciliation Status:       Current Outpatient Medications   Medication Sig Dispense Refill     clopidogrel (PLAVIX) 75 MG tablet Take 75 mg by mouth daily    "    ferrous gluconate (FERGON) 324 (38 Fe) MG tablet Take 324 mg by mouth every other day.       finasteride (PROSCAR) 5 MG tablet Take 5 mg by mouth daily       lisinopril (ZESTRIL) 5 MG tablet Take 10 mg by mouth daily.       omeprazole (PRILOSEC OTC) 20 MG EC tablet Take 20 mg by mouth daily.       tamsulosin (FLOMAX) 0.4 MG capsule Take 0.8 mg by mouth daily       ferrous sulfate (FEROSUL) 325 (65 Fe) MG tablet Take 325 mg by mouth daily (Patient not taking: Reported on 1/13/2025)       LANsoprazole (PREVACID) 15 MG DR capsule Take 15 mg by mouth daily (Patient not taking: Reported on 1/13/2025)       No current facility-administered medications for this visit.     ROS:  4 point ROS including Respiratory, CV, GI and , other than that noted in the HPI,  is negative    Vitals:  BP (!) 142/68   Pulse 86   Temp 98  F (36.7  C)   Resp 16   Ht 1.6 m (5' 3\")   Wt 61.2 kg (135 lb)   SpO2 93%   BMI 23.91 kg/m    Exam:  Alert.  In no distress.  Sitting in recliner.      ASSESSMENT/PLAN:  Primary hypertension  During hospital stay lisinopril was increased.  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  Has no signs or symptom of pain.  Family have previously declined hospice cares due to their preferences.  Regardless his goal of care is for comfort only.  -Continue comfort focused    Generalized muscle weakness  Admitted to long-term care.  Will have therapy to maximize function    Paroxysmal atrial fibrillation (H)  Rate controlled.  On Plavix for peripheral vascular disease.  No anticoagulation due to fall risk      Orders:  No changes    45 MINUTES SPENT BY ME on the date of service doing chart review, history, exam, documentation & further activities per the note.       Electronically signed by: Kelsy Tavera NP\      Sincerely,        Kelsy Tavera NP    Electronically signed  "

## 2025-01-13 NOTE — PROGRESS NOTES
[facility] GERIATRICS  Primary Care Provider & Clinic: Kelsy Tavera NP, [address] [facility] [address]  Chief Complaint   Patient presents with    [facility] F/U     [facility] 1/4/2025 - 1/8/2025     [facility] Medical Record Number: [id number]  Place of Service Where Encounter Took Place: [facility] () [[id number]]    Bc Seth is a 89 year old (7/9/1932), admitted to the above facility from  [facility] . Hospital stay 1/4/25 through 1/8/25.    HPI:    Admitted to long-term [facility] for nursing care.  Patient recently hospitalized for COVID and weakness.  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  For his hypertension the lisinopril was increased during the hospital stay and an order was placed for a recheck of his BMP.  POLST reviewed and signed for DNR/DNI    Reviewed hospital discharge summary, facility EHR including progress notes, orders, MAR, vital signs.    Today he has no concerns.  Nursing reports no concerns.  He was evaluated by therapy and he has cognitive impairments that impair his ability to make safe decisions.  Discussed with nursing and therapy.      CODE STATUS/ADVANCE DIRECTIVES DISCUSSION: No CPR- Do NOT Intubate - DNR / DNI  Patient's living condition: lives in a skilled nursing facility  ALLERGIES: No Known Allergies   PAST MEDICAL HISTORY: No past medical history on file.   PAST SURGICAL HISTORY:  has a past surgical history that includes IR Abdominal Aortogram (11/11/2023).  FAMILY HISTORY: family history is not on file.  SOCIAL HISTORY:      Post Discharge Medication Reconciliation Status:  MED REC REQUIRED  Post Medication Reconciliation Status:       Current Outpatient Medications   Medication Sig Dispense Refill    clopidogrel (PLAVIX) 75 MG tablet Take 75 mg by mouth daily      ferrous gluconate (FERGON) 324 (38 Fe) MG tablet Take 324 mg by mouth every  "other day.      finasteride (PROSCAR) 5 MG tablet Take 5 mg by mouth daily      lisinopril (ZESTRIL) 5 MG tablet Take 10 mg by mouth daily.      omeprazole (PRILOSEC OTC) 20 MG EC tablet Take 20 mg by mouth daily.      tamsulosin (FLOMAX) 0.4 MG capsule Take 0.8 mg by mouth daily      ferrous sulfate (FEROSUL) 325 (65 Fe) MG tablet Take 325 mg by mouth daily (Patient not taking: Reported on 1/13/2025)      LANsoprazole (PREVACID) 15 MG DR capsule Take 15 mg by mouth daily (Patient not taking: Reported on 1/13/2025)       No current facility-administered medications for this visit.     ROS:  4 point ROS including Respiratory, CV, GI and , other than that noted in the HPI,  is negative    Vitals:  BP (!) 142/68   Pulse 86   Temp 98  F (36.7  C)   Resp 16   Ht 1.6 m (5' 3\")   Wt 61.2 kg (135 lb)   SpO2 93%   BMI 23.91 kg/m    Exam:  Alert.  In no distress.  Sitting in recliner.      ASSESSMENT/PLAN:  Primary hypertension  During hospital stay lisinopril was increased.  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  Has no signs or symptom of pain.  Family have previously declined hospice cares due to their preferences.  Regardless his goal of care is for comfort only.  -Continue comfort focused    Generalized muscle weakness  Admitted to long-term care.  Will have therapy to maximize function    Paroxysmal atrial fibrillation (H)  Rate controlled.  On Plavix for peripheral vascular disease.  No anticoagulation due to fall risk      Orders:  No changes    45 MINUTES SPENT BY ME on the date of service doing chart review, history, exam, documentation & further activities per the note.       Electronically signed by: Kelsy Tavera NP\  "

## 2025-01-14 ENCOUNTER — LAB REQUISITION (OUTPATIENT)
Dept: LAB | Facility: CLINIC | Age: OVER 89
End: 2025-01-14
Payer: COMMERCIAL

## 2025-01-14 DIAGNOSIS — N17.9 ACUTE KIDNEY FAILURE, UNSPECIFIED: ICD-10-CM

## 2025-01-16 LAB
ANION GAP SERPL CALCULATED.3IONS-SCNC: 11 MMOL/L (ref 7–15)
BUN SERPL-MCNC: 24.5 MG/DL (ref 8–23)
CALCIUM SERPL-MCNC: 9 MG/DL (ref 8.8–10.4)
CHLORIDE SERPL-SCNC: 109 MMOL/L (ref 98–107)
CREAT SERPL-MCNC: 0.93 MG/DL (ref 0.67–1.17)
EGFRCR SERPLBLD CKD-EPI 2021: 77 ML/MIN/1.73M2
GLUCOSE SERPL-MCNC: 139 MG/DL (ref 70–99)
HCO3 SERPL-SCNC: 20 MMOL/L (ref 22–29)
POTASSIUM SERPL-SCNC: 4.5 MMOL/L (ref 3.4–5.3)
SODIUM SERPL-SCNC: 140 MMOL/L (ref 135–145)

## 2025-01-16 PROCEDURE — 80048 BASIC METABOLIC PNL TOTAL CA: CPT | Mod: ORL | Performed by: FAMILY MEDICINE

## 2025-01-16 PROCEDURE — 36415 COLL VENOUS BLD VENIPUNCTURE: CPT | Mod: ORL | Performed by: FAMILY MEDICINE

## 2025-01-16 PROCEDURE — P9603 ONE-WAY ALLOW PRORATED MILES: HCPCS | Mod: ORL | Performed by: FAMILY MEDICINE

## 2025-01-17 ENCOUNTER — LAB REQUISITION (OUTPATIENT)
Dept: LAB | Facility: CLINIC | Age: OVER 89
End: 2025-01-17
Payer: COMMERCIAL

## 2025-01-17 DIAGNOSIS — I10 ESSENTIAL (PRIMARY) HYPERTENSION: ICD-10-CM

## 2025-01-17 NOTE — PROGRESS NOTES
"[facility] GERIATRICS    PRIMARY CARE PROVIDER AND CLINIC:  Kelsy Tavera NP, [address] [facility] [address]  Chief Complaint   Patient presents with    [facility] Medical Record Number:  [id number]  Place of Service where encounter took place:  [facility] () [[id number]]    Bc Seth  is a 89 year old  (7/9/1932), admitted to the above facility from  [facility] . Hospital stay 1/4/25 through 1/9/25..   HPI:    As per GNP's note:\"Admitted to long-term care for nursing care.  Patient recently hospitalized for COVID and weakness.  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  For his hypertension the lisinopril was increased during the hospital stay and an order was placed for a recheck of his BMP.  POLST reviewed and signed for DNR/DNI\"      TODAY  -Pt seen and examined endorses doing fine. Staff have no concern  ==============================================================================    CODE STATUS/ADVANCE DIRECTIVES DISCUSSION:  No CPR- Do NOT Intubate  DNR / DNI  ALLERGIES: No Known Allergies   PAST MEDICAL HISTORY: No past medical history on file.   PAST SURGICAL HISTORY:   has a past surgical history that includes IR Abdominal Aortogram (11/11/2023).  FAMILY HISTORY: family history is not on file.  SOCIAL HISTORY:     Patient's living condition: lives in a skilled nursing facility    Post Discharge Medication Reconciliation Status:   MED REC REQUIRED  Post Medication Reconciliation Status: medication reconcilation previously completed during another office visit       Current Outpatient Medications   Medication Sig Dispense Refill    clopidogrel (PLAVIX) 75 MG tablet Take 75 mg by mouth daily      ferrous gluconate (FERGON) 324 (38 Fe) MG tablet Take 324 mg by mouth every other day.      finasteride (PROSCAR) 5 MG tablet Take 5 mg by mouth daily      lisinopril " "(ZESTRIL) 5 MG tablet Take 10 mg by mouth daily.      omeprazole (PRILOSEC OTC) 20 MG EC tablet Take 20 mg by mouth daily.      tamsulosin (FLOMAX) 0.4 MG capsule Take 0.8 mg by mouth daily       No current facility-administered medications for this visit.       ROS:  10 point ROS of systems including Constitutional, Eyes, Respiratory, Cardiovascular, Gastroenterology, Genitourinary, Integumentary, Musculoskeletal, Psychiatric were all negative except for pertinent positives noted in my HPI.    Vitals:  BP (!) 176/63   Pulse 63   Temp 97.3  F (36.3  C)   Resp 18   Ht 1.6 m (5' 3\")   Wt 61.2 kg (135 lb)   SpO2 92%   BMI 23.91 kg/m    Exam:GENERAL APPEARANCE:  in no distress,   RESP:  Unlabored breathing. CTA b/l.   CV:  S1S2 audible, regular HR, no murmur appreciated.   ABDOMEN:  soft, NT/ND, BS audible.   M/S:   no joint deformity noted on observation.  Traces pedal edema  SKIN:  No rash noted on observation  NEURO:   No NFD appreciated on observation.   PSYCH:  affect and mood normal    Lab/Diagnostic data: Reviewed in the chart and EHR.      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   - Significant  Deficits requiring NH placement. Requiring extensive assistance from nursing.   - comfort focused care.       Primary hypertension  Peripheral vascular disease  -On lisinopril for BP. Liberalize BP given limited life expectancy.   - on plavix.     Slow transit constipation  -No concern today. Continue current plan of care.       Benign prostatic hyperplasia without lower urinary tract symptoms  -No concern today. Continue current plan of care.       Orders:  NNO    Electronically signed by:  Gracia Khan MD                 "

## 2025-01-19 VITALS
HEART RATE: 63 BPM | OXYGEN SATURATION: 92 % | HEIGHT: 63 IN | RESPIRATION RATE: 18 BRPM | DIASTOLIC BLOOD PRESSURE: 63 MMHG | SYSTOLIC BLOOD PRESSURE: 176 MMHG | WEIGHT: 135 LBS | BODY MASS INDEX: 23.92 KG/M2 | TEMPERATURE: 97.3 F

## 2025-01-20 ENCOUNTER — NURSING HOME VISIT (OUTPATIENT)
Dept: GERIATRICS | Facility: CLINIC | Age: OVER 89
End: 2025-01-20
Payer: COMMERCIAL

## 2025-01-20 DIAGNOSIS — I10 PRIMARY HYPERTENSION: ICD-10-CM

## 2025-01-20 DIAGNOSIS — C78.7 CHOLANGIOCARCINOMA METASTATIC TO LIVER (H): Primary | ICD-10-CM

## 2025-01-20 DIAGNOSIS — K59.01 SLOW TRANSIT CONSTIPATION: ICD-10-CM

## 2025-01-20 DIAGNOSIS — C22.1 CHOLANGIOCARCINOMA METASTATIC TO LIVER (H): Primary | ICD-10-CM

## 2025-01-20 DIAGNOSIS — I73.9 PERIPHERAL VASCULAR DISEASE: ICD-10-CM

## 2025-01-20 DIAGNOSIS — M62.81 GENERALIZED MUSCLE WEAKNESS: ICD-10-CM

## 2025-01-20 DIAGNOSIS — D50.9 IRON DEFICIENCY ANEMIA, UNSPECIFIED IRON DEFICIENCY ANEMIA TYPE: ICD-10-CM

## 2025-01-20 DIAGNOSIS — I48.0 PAROXYSMAL ATRIAL FIBRILLATION (H): ICD-10-CM

## 2025-01-20 DIAGNOSIS — N40.0 BENIGN PROSTATIC HYPERPLASIA WITHOUT LOWER URINARY TRACT SYMPTOMS: ICD-10-CM

## 2025-01-20 LAB
ANION GAP SERPL CALCULATED.3IONS-SCNC: 10 MMOL/L (ref 7–15)
BUN SERPL-MCNC: 21.4 MG/DL (ref 8–23)
CALCIUM SERPL-MCNC: 8.7 MG/DL (ref 8.8–10.4)
CHLORIDE SERPL-SCNC: 105 MMOL/L (ref 98–107)
CREAT SERPL-MCNC: 1.05 MG/DL (ref 0.67–1.17)
EGFRCR SERPLBLD CKD-EPI 2021: 67 ML/MIN/1.73M2
GLUCOSE SERPL-MCNC: 105 MG/DL (ref 70–99)
HCO3 SERPL-SCNC: 23 MMOL/L (ref 22–29)
POTASSIUM SERPL-SCNC: 3.8 MMOL/L (ref 3.4–5.3)
SODIUM SERPL-SCNC: 138 MMOL/L (ref 135–145)

## 2025-01-20 PROCEDURE — 99305 1ST NF CARE MODERATE MDM 35: CPT | Performed by: FAMILY MEDICINE

## 2025-01-20 PROCEDURE — 80048 BASIC METABOLIC PNL TOTAL CA: CPT | Mod: ORL | Performed by: FAMILY MEDICINE

## 2025-01-20 PROCEDURE — P9604 ONE-WAY ALLOW PRORATED TRIP: HCPCS | Mod: ORL | Performed by: FAMILY MEDICINE

## 2025-01-20 PROCEDURE — 36415 COLL VENOUS BLD VENIPUNCTURE: CPT | Mod: ORL | Performed by: FAMILY MEDICINE

## 2025-01-20 NOTE — LETTER
" 1/20/2025      Bc Seth  [address]  [facility] [address]        [facility] GERIATRICS    PRIMARY CARE PROVIDER AND CLINIC:  Kelsy Tavera NP, [address] [facility]  / [facility] [address]  Chief Complaint   Patient presents with     [facility] Medical Record Number:  [id number]  Place of Service where encounter took place:  [facility] () [[id number]]    Bc Seth  is a 89 year old  (7/9/1932), admitted to the above facility from  [facility] . Hospital stay 1/4/25 through 1/9/25..   HPI:    As per GNP's note:\"Admitted to long-term care for nursing care.  Patient recently hospitalized for COVID and weakness.  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  For his hypertension the lisinopril was increased during the hospital stay and an order was placed for a recheck of his BMP.  POLST reviewed and signed for DNR/DNI\"      TODAY  -Pt seen and examined endorses doing fine. Staff have no concern  ==============================================================================    CODE STATUS/ADVANCE DIRECTIVES DISCUSSION:  No CPR- Do NOT Intubate  DNR / DNI  ALLERGIES: No Known Allergies   PAST MEDICAL HISTORY: No past medical history on file.   PAST SURGICAL HISTORY:   has a past surgical history that includes IR Abdominal Aortogram (11/11/2023).  FAMILY HISTORY: family history is not on file.  SOCIAL HISTORY:     Patient's living condition: lives in a skilled nursing facility    Post Discharge Medication Reconciliation Status:   MED REC REQUIRED  Post Medication Reconciliation Status: medication reconcilation previously completed during another office visit       Current Outpatient Medications   Medication Sig Dispense Refill     clopidogrel (PLAVIX) 75 MG tablet Take 75 mg by mouth daily       ferrous gluconate (FERGON) 324 (38 Fe) MG tablet Take 324 mg by mouth every other day.       " "finasteride (PROSCAR) 5 MG tablet Take 5 mg by mouth daily       lisinopril (ZESTRIL) 5 MG tablet Take 10 mg by mouth daily.       omeprazole (PRILOSEC OTC) 20 MG EC tablet Take 20 mg by mouth daily.       tamsulosin (FLOMAX) 0.4 MG capsule Take 0.8 mg by mouth daily       No current facility-administered medications for this visit.       ROS:  10 point ROS of systems including Constitutional, Eyes, Respiratory, Cardiovascular, Gastroenterology, Genitourinary, Integumentary, Musculoskeletal, Psychiatric were all negative except for pertinent positives noted in my HPI.    Vitals:  BP (!) 176/63   Pulse 63   Temp 97.3  F (36.3  C)   Resp 18   Ht 1.6 m (5' 3\")   Wt 61.2 kg (135 lb)   SpO2 92%   BMI 23.91 kg/m    Exam:GENERAL APPEARANCE:  in no distress,   RESP:  Unlabored breathing. CTA b/l.   CV:  S1S2 audible, regular HR, no murmur appreciated.   ABDOMEN:  soft, NT/ND, BS audible.   M/S:   no joint deformity noted on observation.  Traces pedal edema  SKIN:  No rash noted on observation  NEURO:   No NFD appreciated on observation.   PSYCH:  affect and mood normal    Lab/Diagnostic data: Reviewed in the chart and EHR.      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   - Significant  Deficits requiring NH placement. Requiring extensive assistance from nursing.   - comfort focused care.       Primary hypertension  Peripheral vascular disease  -On lisinopril for BP. Liberalize BP given limited life expectancy.   - on plavix.     Slow transit constipation  -No concern today. Continue current plan of care.       Benign prostatic hyperplasia without lower urinary tract symptoms  -No concern today. Continue current plan of care.       Orders:  NNO    Electronically signed by:  Gracia Khan MD "                     Sincerely,        Gracia Khan MD    Electronically signed

## 2025-02-05 ENCOUNTER — NURSING HOME VISIT (OUTPATIENT)
Dept: GERIATRICS | Facility: CLINIC | Age: OVER 89
End: 2025-02-05
Payer: COMMERCIAL

## 2025-02-05 VITALS
SYSTOLIC BLOOD PRESSURE: 171 MMHG | BODY MASS INDEX: 24.1 KG/M2 | RESPIRATION RATE: 24 BRPM | OXYGEN SATURATION: 98 % | DIASTOLIC BLOOD PRESSURE: 59 MMHG | TEMPERATURE: 98.9 F | WEIGHT: 136 LBS | HEART RATE: 95 BPM | HEIGHT: 63 IN

## 2025-02-05 DIAGNOSIS — R39.12 BENIGN PROSTATIC HYPERPLASIA WITH WEAK URINARY STREAM: ICD-10-CM

## 2025-02-05 DIAGNOSIS — K21.9 GASTRO-ESOPHAGEAL REFLUX DISEASE WITHOUT ESOPHAGITIS: ICD-10-CM

## 2025-02-05 DIAGNOSIS — C22.1 CHOLANGIOCARCINOMA METASTATIC TO LIVER (H): ICD-10-CM

## 2025-02-05 DIAGNOSIS — J44.9 CHRONIC OBSTRUCTIVE PULMONARY DISEASE, UNSPECIFIED COPD TYPE (H): ICD-10-CM

## 2025-02-05 DIAGNOSIS — I10 ESSENTIAL HYPERTENSION: ICD-10-CM

## 2025-02-05 DIAGNOSIS — C22.1 CHOLANGIOCARCINOMA METASTATIC TO LIVER (H): Primary | ICD-10-CM

## 2025-02-05 DIAGNOSIS — M62.81 GENERALIZED MUSCLE WEAKNESS: ICD-10-CM

## 2025-02-05 DIAGNOSIS — D50.9 IRON DEFICIENCY ANEMIA, UNSPECIFIED IRON DEFICIENCY ANEMIA TYPE: ICD-10-CM

## 2025-02-05 DIAGNOSIS — R13.13 DYSPHAGIA, CRICOPHARYNGEAL: ICD-10-CM

## 2025-02-05 DIAGNOSIS — I48.0 PAROXYSMAL ATRIAL FIBRILLATION (H): ICD-10-CM

## 2025-02-05 DIAGNOSIS — C78.7 CHOLANGIOCARCINOMA METASTATIC TO LIVER (H): ICD-10-CM

## 2025-02-05 DIAGNOSIS — I10 PRIMARY HYPERTENSION: Primary | ICD-10-CM

## 2025-02-05 DIAGNOSIS — C78.7 CHOLANGIOCARCINOMA METASTATIC TO LIVER (H): Primary | ICD-10-CM

## 2025-02-05 DIAGNOSIS — N40.1 BENIGN PROSTATIC HYPERPLASIA WITH WEAK URINARY STREAM: ICD-10-CM

## 2025-02-05 PROCEDURE — 99309 SBSQ NF CARE MODERATE MDM 30: CPT | Performed by: NURSE PRACTITIONER

## 2025-02-05 NOTE — LETTER
2/5/2025      Bc Homorodeaandrew  [address] [facility] [address]        No notes on file      Sincerely,        Kelsy Tavera NP    Electronically signed   "negative    Vitals:  BP (!) 171/59   Pulse 95   Temp 98.9  F (37.2  C)   Resp 24   Ht 1.6 m (5' 3\")   Wt 61.7 kg (136 lb)   SpO2 98%   BMI 24.09 kg/m    Body mass index is 24.09 kg/m .  Exam:  GENERAL APPEARANCE:  Alert, in no distress  RESP:  respiratory effort and palpation of chest normal, no respiratory distress, lungs sounds clear  CV:  Palpation and auscultation of heart done , regular rate and rhythm, edema none  ABDOMEN:  normal bowel sounds, soft, nontender,   M/S:  Gait and station observed ambulating in room, lying in bed, no tenderness or swelling of the joints   SKIN:  Inspection and palpation of skin and subcutaneous tissue at baseline  PSYCH:  insight and judgement, memory FLAQUITO, affect and mood normal    ASSESSMENT/PLAN  Essential hypertension  Uncontrolled. Lisinopril increased while inpatient. Per nurse manager dtr dose not want him on amlodipine. Offered to increase lisinopril but she declined that too. She requested hydrochlorothiazide.   *continue lisinopril 10 mg p.o. 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  Goal of care is for comfort.  No current pain or other symptoms.    Generalized muscle weakness  Continue with supportive care at the nursing home.    Paroxysmal atrial fibrillation (H)  Rate controlled.  Not on anticoagulation secondary to falls per previous notes.  Continues on Plavix for PVD.  Not on beta-blocker due to hx of bradycardia and sinus pauses.    Chronic obstructive pulmonary disease, unspecified COPD type (H)  Stable.  Not on inhalers.    Dysphagia, cricopharyngeal  Continue with regular diet and thin liquids for comfort    Gastro-esophageal reflux disease without esophagitis  Continues on omeprazole for GERD and Plavix use    Benign prostatic hyperplasia with weak urinary stream  Continues on Flomax.  No current concerns    Iron deficiency anemia, unspecified iron " deficiency anemia type  Continues on iron.  No current concerns.  Check hemoglobin every 6 to 12 months    Orders:  -Hydrochlorothiazide 12.5 mg p.o. every day  -Blood pressure once daily for 2 weeks then update  -BMP in 2 weeks      Electronically signed by: Kelsy Tavera NP      Sincerely,        Kelsy Tavera NP    Electronically signed

## 2025-02-05 NOTE — PROGRESS NOTES
"[facility] GERIATRICS  Chief Complaint   Patient presents with    correction Regulatory     Place of Service where encounter took place:  [facility] () [[id number]]    HPI:    Bc Seth  is 89 year old (7/9/1932), who is being seen today at the above facility for a federally mandated E/M visit. 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 Medications reconciled between EHR's. Problem list reviewed and updated in EPIC.   PHQ9: ***  BIMS: ***  Function: ***  Nutrition concerns: ***  Concerns noted in EHR or from staff: ***    ALLERGIES:Patient has no known allergies.    MEDICATIONS:  Current Outpatient Medications   Medication Sig Dispense Refill    clopidogrel (PLAVIX) 75 MG tablet Take 75 mg by mouth daily      ferrous gluconate (FERGON) 324 (38 Fe) MG tablet Take 324 mg by mouth every other day.      finasteride (PROSCAR) 5 MG tablet Take 5 mg by mouth daily      lisinopril (ZESTRIL) 5 MG tablet Take 10 mg by mouth daily.      omeprazole (PRILOSEC OTC) 20 MG EC tablet Take 20 mg by mouth daily.      tamsulosin (FLOMAX) 0.4 MG capsule Take 0.8 mg by mouth daily       No current facility-administered medications for this visit.       ROS:  {ROS FGS:773529}    Vitals:  BP (!) 171/59   Pulse 95   Temp 98.9  F (37.2  C)   Resp 24   Ht 1.6 m (5' 3\")   Wt 61.7 kg (136 lb)   SpO2 98%   BMI 24.09 kg/m    Body mass index is 24.09 kg/m .  Exam:  GENERAL APPEARANCE:  Alert, in no distress  RESP:  respiratory effort and palpation of chest normal, no respiratory distress, lungs sounds {LUNGS:013528}  CV:  Palpation and auscultation of heart done , regular rate and rhythm, edema ***  ABDOMEN:  normal bowel sounds, soft, nontender,   M/S:  Gait and station observed ***, no tenderness or " swelling of the joints   SKIN:  Inspection and palpation of skin and subcutaneous tissue at baseline  PSYCH:  insight and judgement, memory ***, affect and mood normal    ASSESSMENT/PLAN  ***    Orders:  ***    Electronically signed by: Kelsy Tavera NP     12 months    Orders:  -Hydrochlorothiazide 12.5 mg p.o. every day  -Blood pressure once daily for 2 weeks then update  -BMP in 2 weeks      Electronically signed by: Kelsy Tavera NP

## 2025-02-10 PROBLEM — R79.89 LFT ELEVATION: Status: RESOLVED | Noted: 2024-10-10 | Resolved: 2025-02-10

## 2025-02-10 PROBLEM — R52 PAIN, UNSPECIFIED: Status: RESOLVED | Noted: 2021-11-23 | Resolved: 2025-02-10

## 2025-02-10 PROBLEM — N40.0 BENIGN PROSTATIC HYPERPLASIA WITHOUT LOWER URINARY TRACT SYMPTOMS: Status: RESOLVED | Noted: 2021-05-03 | Resolved: 2025-02-10

## 2025-02-10 PROBLEM — R41.82 ALTERED MENTAL STATUS: Status: RESOLVED | Noted: 2024-05-11 | Resolved: 2025-02-10

## 2025-02-10 PROBLEM — N39.0 URINARY TRACT INFECTION, SITE NOT SPECIFIED: Status: RESOLVED | Noted: 2021-11-23 | Resolved: 2025-02-10

## 2025-02-10 PROBLEM — E78.5 HYPERLIPIDEMIA, UNSPECIFIED: Status: RESOLVED | Noted: 2021-11-23 | Resolved: 2025-02-10

## 2025-02-10 PROBLEM — I45.5 SINUS PAUSE: Status: RESOLVED | Noted: 2021-08-29 | Resolved: 2025-02-10

## 2025-02-10 PROBLEM — R20.2 PARESTHESIA OF RIGHT FOOT: Status: RESOLVED | Noted: 2022-11-23 | Resolved: 2025-02-10

## 2025-02-10 PROBLEM — D64.9 SEVERE ANEMIA: Status: RESOLVED | Noted: 2024-05-10 | Resolved: 2025-02-10

## 2025-02-10 PROBLEM — R53.1 WEAKNESS: Status: RESOLVED | Noted: 2021-11-23 | Resolved: 2025-02-10

## 2025-02-10 PROBLEM — J69.0 ASPIRATION PNEUMONIA OF RIGHT LOWER LOBE (H): Status: RESOLVED | Noted: 2024-05-13 | Resolved: 2025-02-10

## 2025-02-10 PROBLEM — J81.1 NON-CARDIOGENIC PULMONARY EDEMA: Status: RESOLVED | Noted: 2024-05-10 | Resolved: 2025-02-10

## 2025-02-10 PROBLEM — R13.13 DYSPHAGIA, PHARYNGEAL PHASE: Status: RESOLVED | Noted: 2021-11-23 | Resolved: 2025-02-10

## 2025-02-10 PROBLEM — R13.13 DYSPHAGIA, CRICOPHARYNGEAL: Status: ACTIVE | Noted: 2018-05-25

## 2025-02-10 PROBLEM — D64.9 ANEMIA, UNSPECIFIED TYPE: Status: RESOLVED | Noted: 2024-05-23 | Resolved: 2025-02-10

## 2025-02-10 PROBLEM — I45.5 OTHER SPECIFIED HEART BLOCK: Status: RESOLVED | Noted: 2021-11-23 | Resolved: 2025-02-10
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
## 2025-02-10 PROBLEM — R41.0 DISORIENTATION: Status: RESOLVED | Noted: 2022-12-01 | Resolved: 2025-02-10

## 2025-02-10 PROBLEM — R47.89 OTHER SPEECH DISTURBANCES: Status: RESOLVED | Noted: 2021-06-04 | Resolved: 2025-02-10

## 2025-02-10 PROBLEM — E78.5 DYSLIPIDEMIA: Status: RESOLVED | Noted: 2021-05-03 | Resolved: 2025-02-10

## 2025-02-10 PROBLEM — U07.1 COVID-19 VIRUS INFECTION: Status: RESOLVED | Noted: 2025-01-05 | Resolved: 2025-02-10

## 2025-02-18 ENCOUNTER — LAB REQUISITION (OUTPATIENT)
Dept: LAB | Facility: CLINIC | Age: OVER 89
End: 2025-02-18
Payer: COMMERCIAL

## 2025-02-18 DIAGNOSIS — I10 ESSENTIAL (PRIMARY) HYPERTENSION: ICD-10-CM

## 2025-02-21 LAB
ANION GAP SERPL CALCULATED.3IONS-SCNC: 13 MMOL/L (ref 7–15)
BUN SERPL-MCNC: 25.9 MG/DL (ref 8–23)
CALCIUM SERPL-MCNC: 9.5 MG/DL (ref 8.8–10.4)
CHLORIDE SERPL-SCNC: 104 MMOL/L (ref 98–107)
CREAT SERPL-MCNC: 1.26 MG/DL (ref 0.67–1.17)
EGFRCR SERPLBLD CKD-EPI 2021: 54 ML/MIN/1.73M2
GLUCOSE SERPL-MCNC: 129 MG/DL (ref 70–99)
HCO3 SERPL-SCNC: 22 MMOL/L (ref 22–29)
POTASSIUM SERPL-SCNC: 4 MMOL/L (ref 3.4–5.3)
SODIUM SERPL-SCNC: 139 MMOL/L (ref 135–145)

## 2025-02-21 PROCEDURE — 80048 BASIC METABOLIC PNL TOTAL CA: CPT | Mod: ORL | Performed by: FAMILY MEDICINE

## 2025-02-21 PROCEDURE — P9604 ONE-WAY ALLOW PRORATED TRIP: HCPCS | Mod: ORL | Performed by: FAMILY MEDICINE

## 2025-02-21 PROCEDURE — 36415 COLL VENOUS BLD VENIPUNCTURE: CPT | Mod: ORL | Performed by: FAMILY MEDICINE

## 2025-03-13 NOTE — PROGRESS NOTES
[facility] GERIATRICS  Chief Complaint   [facility] presents with    [id number]  Place of Service where encounter took place:  [facility] () [[id number]]    HPI:    Bc Seth  is 89 year old (7/9/1932), who is being seen today for a federally mandated E/M visit.     Today's concerns are:  ---------------------------  -cholangiocarcinoma  Pt seen and examined, still on comfort care, staff have no concern. GNP has no concern as well. Pt sitting in the chair, smiling, appears comfortable, voice faint, mumbling at times, cannot follow.   ===========================================  MEDICATIONS:  MED REC REQUIRED  Post Medication Reconciliation Status: medication reconcilation previously completed during another office visit         Review of your medicines            Accurate as of March 17, 2025  7:17 AM. If you have any questions, ask your nurse or doctor.                CONTINUE these medicines which have NOT CHANGED        Dose / Directions   clopidogrel 75 MG tablet  Commonly known as: PLAVIX      Dose: 75 mg  Take 75 mg by mouth daily  Refills: 0     ferrous gluconate 324 (38 Fe) MG tablet  Commonly known as: FERGON  Indication: Anemia From Inadequate Iron in the Body      Dose: 324 mg  Take 324 mg by mouth every other day.  Refills: 0     finasteride 5 MG tablet  Commonly known as: PROSCAR  Indication: Benign Enlargement of Prostate      Dose: 5 mg  Take 5 mg by mouth daily  Refills: 0     lisinopril 5 MG tablet  Commonly known as: ZESTRIL  Indication: High Blood Pressure      Dose: 10 mg  Take 10 mg by mouth daily.  Refills: 0     omeprazole 20 MG EC tablet  Commonly known as: PriLOSEC OTC  Indication: GERD      Dose: 20 mg  Take 20 mg by mouth daily.  Refills: 0     tamsulosin 0.4 MG capsule  Commonly known as: FLOMAX  Indication: Benign Enlargement of Prostate      Dose: 0.8 mg  Take 0.8 mg by mouth daily  Refills: 0         "    ROS: unobtainable    Vitals:  /76   Pulse 77   Temp 98.4  F (36.9  C)   Resp 16   Ht 1.6 m (5' 3\")   Wt 61.3 kg (135 lb 3.2 oz)   SpO2 97%   BMI 23.95 kg/m    Body mass index is 23.95 kg/m .  Exam:  GENERAL APPEARANCE:  in no distress, sitting up in the wheelchair comfortably.  RESP:  Unlabored breathing. CTA b/l.   CV:  S1S2 audible, regular HR, no murmur appreciated.   ABDOMEN:  soft, NT/ND, BS audible.   M/S:   no joint deformity noted on observation.    SKIN:  No rash noted on observation  NEURO: Hypophonic voice  PSYCH:  affect and mood pleasant     Lab/Diagnostic data:  Reviewed in the chart and EHR.      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   - Significant  Deficits requiring NH placement. Requiring extensive assistance from nursing.   - comfort focused care.         Primary hypertension  Peripheral vascular disease  - due to ongoing increased SBP, HCTZ added as per family request.   -On lisinopril for BP. Liberalize BP given limited life expectancy.   - on plavix.      Slow transit constipation  -No concern today. Continue current plan of care.         Benign prostatic hyperplasia without lower urinary tract symptoms  -No concern today. Continue current plan of care.         Orders:  NNO     Electronically signed by:  Gracia Khan MD        "

## 2025-03-17 ENCOUNTER — NURSING HOME VISIT (OUTPATIENT)
Dept: GERIATRICS | Facility: CLINIC | Age: OVER 89
End: 2025-03-17
Payer: COMMERCIAL

## 2025-03-17 VITALS
HEIGHT: 63 IN | OXYGEN SATURATION: 97 % | HEART RATE: 77 BPM | WEIGHT: 135.2 LBS | BODY MASS INDEX: 23.96 KG/M2 | RESPIRATION RATE: 16 BRPM | SYSTOLIC BLOOD PRESSURE: 126 MMHG | DIASTOLIC BLOOD PRESSURE: 76 MMHG | TEMPERATURE: 98.4 F

## 2025-03-17 DIAGNOSIS — C22.1 CHOLANGIOCARCINOMA METASTATIC TO LIVER (H): Primary | ICD-10-CM

## 2025-03-17 DIAGNOSIS — I10 PRIMARY HYPERTENSION: ICD-10-CM

## 2025-03-17 DIAGNOSIS — I73.9 PERIPHERAL VASCULAR DISEASE: ICD-10-CM

## 2025-03-17 DIAGNOSIS — N40.0 BENIGN PROSTATIC HYPERPLASIA WITHOUT LOWER URINARY TRACT SYMPTOMS: ICD-10-CM

## 2025-03-17 DIAGNOSIS — K59.01 SLOW TRANSIT CONSTIPATION: ICD-10-CM

## 2025-03-17 DIAGNOSIS — C78.7 CHOLANGIOCARCINOMA METASTATIC TO LIVER (H): Primary | ICD-10-CM

## 2025-03-17 DIAGNOSIS — D50.9 IRON DEFICIENCY ANEMIA, UNSPECIFIED IRON DEFICIENCY ANEMIA TYPE: ICD-10-CM

## 2025-03-17 DIAGNOSIS — M62.81 GENERALIZED MUSCLE WEAKNESS: ICD-10-CM

## 2025-03-17 PROCEDURE — 99309 SBSQ NF CARE MODERATE MDM 30: CPT | Performed by: FAMILY MEDICINE

## 2025-03-17 NOTE — LETTER
3/17/2025      Bc Seth  [address]  [facility] [address]        [facility] GERIATRICS  Chief Complaint   Patient presents with     [facility] Medical Record Number:  [id number]  Place of Service where encounter took place:  [facility] () [[id number]]    HPI:    Bc Seth  is 89 year old (7/9/1932), who is being seen today for a federally mandated E/M visit.     Today's concerns are:  ---------------------------  -cholangiocarcinoma  Pt seen and examined, still on comfort care, staff have no concern. GNP has no concern as well. Pt sitting in the chair, smiling, appears comfortable, voice faint, mumbling at times, cannot follow.   ===========================================  MEDICATIONS:  MED REC REQUIRED  Post Medication Reconciliation Status: medication reconcilation previously completed during another office visit         Review of your medicines            Accurate as of March 17, 2025  7:17 AM. If you have any questions, ask your nurse or doctor.                CONTINUE these medicines which have NOT CHANGED        Dose / Directions   clopidogrel 75 MG tablet  Commonly known as: PLAVIX      Dose: 75 mg  Take 75 mg by mouth daily  Refills: 0     ferrous gluconate 324 (38 Fe) MG tablet  Commonly known as: FERGON  Indication: Anemia From Inadequate Iron in the Body      Dose: 324 mg  Take 324 mg by mouth every other day.  Refills: 0     finasteride 5 MG tablet  Commonly known as: PROSCAR  Indication: Benign Enlargement of Prostate      Dose: 5 mg  Take 5 mg by mouth daily  Refills: 0     lisinopril 5 MG tablet  Commonly known as: ZESTRIL  Indication: High Blood Pressure      Dose: 10 mg  Take 10 mg by mouth daily.  Refills: 0     omeprazole 20 MG EC tablet  Commonly known as: PriLOSEC OTC  Indication: GERD      Dose: 20 mg  Take 20 mg by mouth daily.  Refills: 0     tamsulosin 0.4 MG capsule  Commonly known as: FLOMAX  Indication: Benign Enlargement  "of Prostate      Dose: 0.8 mg  Take 0.8 mg by mouth daily  Refills: 0            ROS: unobtainable    Vitals:  /76   Pulse 77   Temp 98.4  F (36.9  C)   Resp 16   Ht 1.6 m (5' 3\")   Wt 61.3 kg (135 lb 3.2 oz)   SpO2 97%   BMI 23.95 kg/m    Body mass index is 23.95 kg/m .  Exam:  GENERAL APPEARANCE:  in no distress, sitting up in the wheelchair comfortably.  RESP:  Unlabored breathing. CTA b/l.   CV:  S1S2 audible, regular HR, no murmur appreciated.   ABDOMEN:  soft, NT/ND, BS audible.   M/S:   no joint deformity noted on observation.    SKIN:  No rash noted on observation  NEURO: Hypophonic voice  PSYCH:  affect and mood pleasant     Lab/Diagnostic data:  Reviewed in the chart and EHR.      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   - Significant  Deficits requiring NH placement. Requiring extensive assistance from nursing.   - comfort focused care.         Primary hypertension  Peripheral vascular disease  - due to ongoing increased SBP, HCTZ added as per family request.   -On lisinopril for BP. Liberalize BP given limited life expectancy.   - on plavix.      Slow transit constipation  -No concern today. Continue current plan of care.         Benign prostatic hyperplasia without lower urinary tract symptoms  -No concern today. Continue current plan of care.         Orders:  NNO     Electronically signed by:  Gracia Khan MD            Sincerely,        Gracia Khan MD    Electronically signed  "

## 2025-03-20 ENCOUNTER — DOCUMENTATION ONLY (OUTPATIENT)
Dept: OTHER | Facility: CLINIC | Age: OVER 89
End: 2025-03-20
Payer: COMMERCIAL

## 2025-05-14 ENCOUNTER — NURSING HOME VISIT (OUTPATIENT)
Dept: GERIATRICS | Facility: CLINIC | Age: OVER 89
End: 2025-05-14
Payer: COMMERCIAL

## 2025-05-14 VITALS
TEMPERATURE: 97.7 F | BODY MASS INDEX: 22.39 KG/M2 | HEIGHT: 63 IN | RESPIRATION RATE: 20 BRPM | OXYGEN SATURATION: 94 % | DIASTOLIC BLOOD PRESSURE: 85 MMHG | WEIGHT: 126.4 LBS | HEART RATE: 81 BPM | SYSTOLIC BLOOD PRESSURE: 125 MMHG

## 2025-05-14 DIAGNOSIS — M62.81 GENERALIZED MUSCLE WEAKNESS: ICD-10-CM

## 2025-05-14 DIAGNOSIS — I48.0 PAROXYSMAL ATRIAL FIBRILLATION (H): ICD-10-CM

## 2025-05-14 DIAGNOSIS — C78.7 CHOLANGIOCARCINOMA METASTATIC TO LIVER (H): Primary | ICD-10-CM

## 2025-05-14 DIAGNOSIS — I10 ESSENTIAL HYPERTENSION: ICD-10-CM

## 2025-05-14 DIAGNOSIS — R39.12 BENIGN PROSTATIC HYPERPLASIA WITH WEAK URINARY STREAM: ICD-10-CM

## 2025-05-14 DIAGNOSIS — D50.9 IRON DEFICIENCY ANEMIA, UNSPECIFIED IRON DEFICIENCY ANEMIA TYPE: ICD-10-CM

## 2025-05-14 DIAGNOSIS — J44.9 CHRONIC OBSTRUCTIVE PULMONARY DISEASE, UNSPECIFIED COPD TYPE (H): ICD-10-CM

## 2025-05-14 DIAGNOSIS — N40.1 BENIGN PROSTATIC HYPERPLASIA WITH WEAK URINARY STREAM: ICD-10-CM

## 2025-05-14 DIAGNOSIS — K21.9 GASTRO-ESOPHAGEAL REFLUX DISEASE WITHOUT ESOPHAGITIS: ICD-10-CM

## 2025-05-14 DIAGNOSIS — R13.13 DYSPHAGIA, CRICOPHARYNGEAL: ICD-10-CM

## 2025-05-14 DIAGNOSIS — C22.1 CHOLANGIOCARCINOMA METASTATIC TO LIVER (H): Primary | ICD-10-CM

## 2025-05-14 PROCEDURE — 99309 SBSQ NF CARE MODERATE MDM 30: CPT | Mod: 25 | Performed by: NURSE PRACTITIONER

## 2025-05-14 PROCEDURE — G0439 PPPS, SUBSEQ VISIT: HCPCS | Performed by: NURSE PRACTITIONER

## 2025-05-14 NOTE — PROGRESS NOTES
Preventive Care Visit  [facility] GERIATRIC SERVICES  Kelsy Tavera NP, Geriatric Medicine  May 14, 2025  {Provider  Link to SmartSet :892164}    {PROVIDER CHARTING PREFERENCE:386434}    Chastity Yancey is a 89 year old, presenting for the following:  Wellness Visit and Nursing Home Regulatory      {ROOMER if patient is in their first year of Medicare a vision screen is required click here to document the Vison screen and then refresh the note to pull in results  :039982}      HPI  ***   {MA/LPN/RN Pre-Provider Visit Orders- hCG/UA/Strep (Optional):499629}  Annual Wellness Visit   {Split Bill scripting  The purpose of this visit is to discuss your medical history and prevent health problems before you are sick. You may be responsible for a co-pay, coinsurance, or deductible if your visit today includes services such as checking on a sore throat, having an x-ray or lab test, or treating and evaluating a new or existing condition :357447}  Patient has been advised of split billing requirements and indicates understanding: {Yes and No:916597}   {Provider  Link to SmartSet :437491}     {ROOMER if patient is in their first year of Medicare a vision screen is required click here to document the Vison screen and then refresh the note to pull in results  :561975}  Health Care Directive  {The storyboard will display whether the patient has ACP docs on file. Hover over the Code section in the storyboard to access the ACP documents. :361242}  {(AWV REQUIRED) Advance Care Planning Reviewed:879404}  In general, how would you rate your overall physical health? {Physical Health:172581}  Do you have a special diet?  {Diet:801910}  { Click here to complete exercise, social connection and stress questions After questions have been completed, refresh note to pull answers into note  :302554}       No data to display              Do you see a dentist two times every year?  {Dentist:131721}  Have you been more tired than usual  "lately?  {Energy level:475965}  If you drink alcohol do you typically have >3 drinks per day or >7 drinks per week? { :816683}  Do you have a current opioid prescription? { :089693}  Do you use any other controlled substances or medications that are not prescribed by a provider? {Substance Use :642067::\"None\"}     {Provider  If there are gaps in the social history shown above, please follow the link to update and then refresh the note Link to Social and Substance History :821877}  Needs assistance for the following daily activities: { :678874}  Which of the following safety concerns are present in your home?  { :987840::\"none identified\"}   Do you (or your family members) have any concerns about your safety while driving?  {yes/no:591875}  Do you have any of the following hearing concerns?: { :685343}  In the past 6 months, have you been bothered by leaking of urine? {yes/no:249046}     {Fall Risk REQUIRED for AWV, if date of completion is not today  Click here for Fall Risk flowsheet then refresh note to pull results :248420}       No data to display                 {Rooming Staff Patient needs a PHQ as part of the AWV.  Use this link to complete and then refresh the note to pull results Link to PHQ2 Assessment :318117}  {USE TO PULL IN PHQ RESULTS FOR TODAY:592262}    {Provider  REQUIRED FOR AWV Use the storyboard to review patient history, after sections have been marked as reviewed, refresh note to capture documentation:053367}  {Provider   REQUIRED AWV use this link to review and update sexual activity history  after section has been marked as reviewed, refresh note to capture documentation:307980}  Reviewed and updated as needed this visit by Provider                    {HISTORY OPTIONS (Optional):360940}    Current providers sharing in care for this patient include:  Patient Care Team:  Kelsy Tavera NP as PCP - General (Geriatric Medicine)  Kelsy Tavera NP as Assigned PCP  Kelsy Tavera NP as Nurse " Practitioner (Geriatric Medicine)  Stephanie Harris as Geriatric Services   Gracia Khan MD as Hospitalist (Family Medicine)  [facility]    The following health maintenance items are reviewed in Epic and correct as of today:  Health Maintenance   Topic Date Due    SPIROMETRY  Never done    COPD ACTION PLAN  Never done    COVID-19 Vaccine (1) Never done    MEDICARE ANNUAL WELLNESS VISIT  Never done    FALL RISK ASSESSMENT  Never done    RSV VACCINE (1 - 1-dose 75+ series) Never done    DTAP/TDAP/TD IMMUNIZATION (3 - Td or Tdap) 07/18/2015    PHQ-2 (once per calendar year)  Never done    INFLUENZA VACCINE (Season Ended) 09/01/2025    BMP  01/16/2026    ADVANCE CARE PLANNING  03/20/2030    Pneumococcal Vaccine: 50+ Years  Completed    ZOSTER IMMUNIZATION  Completed    HPV IMMUNIZATION  Aged Out    MENINGITIS IMMUNIZATION  Aged Out       Appropriate preventive services were discussed with this patient, including applicable screening as appropriate for fall prevention, nutrition, physical activity, Tobacco-use cessation, weight loss and cognition.  Checklist reviewing preventive services available has been given to the patient.    {Provider  The Mini-Cog is incomplete, use link to complete and refresh note Link to Mini-Cog :037480}       No data to display              {A Mini-Cog total score of 0-2 suggests the possibility of dementia, score of 3-5 suggests no dementia:098722}       {additonal problems for provider to add (Optional):873578}  Advance Care Planning  {The storyboard will display whether the patient has ACP docs on file. Hover over the Code section in the storyboard to access the ACP documents. :801897}  {(AWV REQUIRED) Advance Care Planning Reviewed:473198}         No data to display                   No data to display                   No data to display                      No data to display                   No data to display                   No data  to display                     No data to display                {Rooming Staff Patient needs a PHQ as part of the AWV.  Use this link to complete and then refresh the note to pull results Link to PHQ2 Assessment :332242}  {USE TO PULL IN PHQ RESULTS FOR TODAY:234415}       No data to display                 {Provider  If there are gaps in the social history shown above, please follow the link to update and then refresh the note Link to Social and Substance History :052547}    {Provider  REQUIRED FOR AWV Use the storyboard to review patient history, after sections have been marked as reviewed, refresh note to capture documentation:867801}  {Provider   REQUIRED AWV use this link to review and update sexual activity history  after section has been marked as reviewed, refresh note to capture documentation:189805}  Reviewed and updated as needed this visit by Provider                    {HISTORY OPTIONS (Optional):846344}  Current providers sharing in care for this patient include:  Patient Care Team:  Kelsy Tavera NP as PCP - General (Geriatric Medicine)  Kelsy Tavera NP as Assigned PCP  Kelsy Tavera NP as Nurse Practitioner (Geriatric Medicine)  Stephanie Harris as Geriatric Services   Gracia Khan MD as Hospitalist (Family Medicine)  [facility]    The following health maintenance items are reviewed in Epic and correct as of today:  Health Maintenance   Topic Date Due    SPIROMETRY  Never done    COPD ACTION PLAN  Never done    COVID-19 Vaccine (1) Never done    MEDICARE ANNUAL WELLNESS VISIT  Never done    FALL RISK ASSESSMENT  Never done    RSV VACCINE (1 - 1-dose 75+ series) Never done    DTAP/TDAP/TD IMMUNIZATION (3 - Td or Tdap) 07/18/2015    PHQ-2 (once per calendar year)  Never done    INFLUENZA VACCINE (Season Ended) 09/01/2025    BMP  01/16/2026    ADVANCE CARE PLANNING  03/20/2030    Pneumococcal Vaccine: 50+ Years  Completed    ZOSTER IMMUNIZATION   "Completed    HPV IMMUNIZATION  Aged Out    MENINGITIS IMMUNIZATION  Aged Out       {ROS Picklists (Optional):568880}     Objective    Exam  /85   Pulse 81   Temp 97.7  F (36.5  C)   Resp 20   Ht 1.6 m (5' 3\")   Wt 57.3 kg (126 lb 6.4 oz)   SpO2 94%   BMI 22.39 kg/m     Estimated body mass index is 22.39 kg/m  as calculated from the following:    Height as of this encounter: 1.6 m (5' 3\").    Weight as of this encounter: 57.3 kg (126 lb 6.4 oz).    Physical Exam  {Exam Choices (Optional):441875}  {Provider  The Mini-Cog is incomplete, use link to complete and refresh note Link to Mini-Cog :002342}       No data to display              {A Mini-Cog total score of 0-2 suggests the possibility of dementia, score of 3-5 suggests no dementia:892377}         Signed Electronically by: Kelsy Tavera NP  {Email feedback regarding this note to [email or web address]   :857904}  "

## 2025-05-14 NOTE — LETTER
5/14/2025      Bc Homorodекатерина  [address] [facility] [address]        No notes on file      Sincerely,        Kelsy Tavera NP    Electronically signed   drinks per day or >7 drinks per week? No  Do you have a current opioid prescription? No  Do you use any other controlled substances or medications that are not prescribed by a provider? None       Needs assistance for the following daily activities: (!) TELEPHONE USE, (!) TRANSPORTATION, (!) SHOPPING, (!) PREPARING MEALS, (!) HOUSEWORK, (!) BATHING, (!) LAUNDRY, (!) MONEY MANAGEMENT, and (!) DRESSING  Which of the following safety concerns are present in your home?  none identified   Do you (or your family members) have any concerns about your safety while driving?  No  Do you have any of the following hearing concerns?: No hearing concerns  In the past 6 months, have you been bothered by leaking of urine? No            5/19/2025   Fall Risk   Fallen 2 or more times in the past year? No   Trouble with walking or balance? No            Today's PHQ-2 Score:       5/19/2025     3:42 PM   PHQ-2 ( 1999 [employer])   Q1: Little interest or pleasure in doing things 0   Q2: Feeling down, depressed or hopeless 0   PHQ-2 Score 0           Reviewed and updated as needed this visit by Provider                    BP Readings from Last 3 Encounters:   05/14/25 125/85   03/17/25 126/76   02/05/25 (!) 171/59    Wt Readings from Last 3 Encounters:   05/14/25 57.3 kg (126 lb 6.4 oz)   03/17/25 61.3 kg (135 lb 3.2 oz)   02/05/25 61.7 kg (136 lb)                    Current providers sharing in care for this patient include:  Patient Care Team:  Kelsy Tavera NP as PCP - General (Geriatric Medicine)  Kelsy Tavera NP as Assigned PCP  Kelsy Tavera NP as Nurse Practitioner (Geriatric Medicine)  Stephanie Harris as Geriatric Services   Gracia Khan MD as Hospitalist (Family Medicine)  [facility]    The following health maintenance items are reviewed in Epic and correct as of today:  Health Maintenance   Topic Date Due     SPIROMETRY  Never done     COPD ACTION PLAN  Never done     COVID-19 Vaccine  (1) Never done     MEDICARE ANNUAL WELLNESS VISIT  Never done     RSV VACCINE (1 - 1-dose 75+ series) Never done     DTAP/TDAP/TD IMMUNIZATION (3 - Td or Tdap) 07/18/2015     INFLUENZA VACCINE (Season Ended) 09/01/2025     BMP  01/16/2026     FALL RISK ASSESSMENT  05/14/2026     ADVANCE CARE PLANNING  03/20/2030     PHQ-2 (once per calendar year)  Completed     Pneumococcal Vaccine: 50+ Years  Completed     ZOSTER IMMUNIZATION  Completed     HPV IMMUNIZATION  Aged Out     MENINGITIS IMMUNIZATION  Aged Out       Appropriate preventive services were discussed with this patient, including applicable screening as appropriate for fall prevention, nutrition, physical activity, Tobacco-use cessation, weight loss and cognition.  Checklist reviewing preventive services available has been given to the patient.          5/19/2025   Mini Cog   Mini-Cog Not Completed (choose reason) Known dementia          Advance Care Planning    Document on file is a Health Care Directive or POLST.         No data to display                   No data to display                   No data to display                      No data to display                   No data to display                   No data to display                     No data to display                    Today's PHQ-2 Score:       5/19/2025     3:42 PM   PHQ-2 ( 1999 [employer])   Q1: Little interest or pleasure in doing things 0   Q2: Feeling down, depressed or hopeless 0   PHQ-2 Score 0          No data to display                         Reviewed and updated as needed this visit by Provider                  Current providers sharing in care for this patient include:  Patient Care Team:  Kelsy Tavera NP as PCP - General (Geriatric Medicine)  Kelsy Tavera NP as Assigned PCP  Kelsy Tavera NP as Nurse Practitioner (Geriatric Medicine)  Stephanie Harris as Geriatric Services   Gracia Khan MD as Hospitalist (Family Medicine)  [facility]), [facility]  "Care    The following health maintenance items are reviewed in Epic and correct as of today:  Health Maintenance   Topic Date Due     SPIROMETRY  Never done     COPD ACTION PLAN  Never done     COVID-19 Vaccine (1) Never done     MEDICARE ANNUAL WELLNESS VISIT  Never done     RSV VACCINE (1 - 1-dose 75+ series) Never done     DTAP/TDAP/TD IMMUNIZATION (3 - Td or Tdap) 07/18/2015     INFLUENZA VACCINE (Season Ended) 09/01/2025     BMP  01/16/2026     FALL RISK ASSESSMENT  05/14/2026     ADVANCE CARE PLANNING  03/20/2030     PHQ-2 (once per calendar year)  Completed     Pneumococcal Vaccine: 50+ Years  Completed     ZOSTER IMMUNIZATION  Completed     HPV IMMUNIZATION  Aged Out     MENINGITIS IMMUNIZATION  Aged Out         Review of Systems  Constitutional, HEENT, cardiovascular, pulmonary, gi and gu systems are negative, except as otherwise noted. Reviewed with nurse     Objective   Exam  /85   Pulse 81   Temp 97.7  F (36.5  C)   Resp 20   Ht 1.6 m (5' 3\")   Wt 57.3 kg (126 lb 6.4 oz)   SpO2 94%   BMI 22.39 kg/m     Estimated body mass index is 22.39 kg/m  as calculated from the following:    Height as of this encounter: 1.6 m (5' 3\").    Weight as of this encounter: 57.3 kg (126 lb 6.4 oz).    Physical Exam  GENERAL: alert and no distress  NECK: no adenopathy, no asymmetry, masses, or scars  RESP: lungs clear to auscultation - no rales, rhonchi or wheezes  CV: regular rate and rhythm, normal S1 S2, no S3 or S4, no murmur, click or rub, no peripheral edema  ABDOMEN: soft, nontender, no hepatosplenomegaly, no masses and bowel sounds normal  MS: no gross musculoskeletal defects noted, no edema        5/19/2025   Mini Cog   Mini-Cog Not Completed (choose reason) Known dementia              Signed Electronically by: Kelsy Tavera NP        Sincerely,        Kelsy Tavera NP    Electronically signed  "

## 2025-05-19 ASSESSMENT — ACTIVITIES OF DAILY LIVING (ADL): CURRENT_FUNCTION: NEEDS ASSISTANCE

## 2025-05-20 ENCOUNTER — LAB REQUISITION (OUTPATIENT)
Dept: LAB | Facility: CLINIC | Age: OVER 89
End: 2025-05-20
Payer: COMMERCIAL

## 2025-05-20 DIAGNOSIS — I10 ESSENTIAL (PRIMARY) HYPERTENSION: ICD-10-CM

## 2025-05-22 ENCOUNTER — RESULTS FOLLOW-UP (OUTPATIENT)
Dept: GERIATRICS | Facility: CLINIC | Age: OVER 89
End: 2025-05-22

## 2025-05-22 LAB
ANION GAP SERPL CALCULATED.3IONS-SCNC: 16 MMOL/L (ref 7–15)
BUN SERPL-MCNC: 27.7 MG/DL (ref 8–23)
CALCIUM SERPL-MCNC: 10 MG/DL (ref 8.8–10.4)
CHLORIDE SERPL-SCNC: 99 MMOL/L (ref 98–107)
CREAT SERPL-MCNC: 1.05 MG/DL (ref 0.67–1.17)
EGFRCR SERPLBLD CKD-EPI 2021: 67 ML/MIN/1.73M2
GLUCOSE SERPL-MCNC: 84 MG/DL (ref 70–99)
HCO3 SERPL-SCNC: 21 MMOL/L (ref 22–29)
HGB BLD-MCNC: 12 G/DL (ref 13.3–17.7)
MCV RBC AUTO: 88 FL (ref 78–100)
POTASSIUM SERPL-SCNC: 4.2 MMOL/L (ref 3.4–5.3)
SODIUM SERPL-SCNC: 136 MMOL/L (ref 135–145)

## 2025-05-22 PROCEDURE — 85018 HEMOGLOBIN: CPT | Mod: ORL | Performed by: NURSE PRACTITIONER

## 2025-05-22 PROCEDURE — 80048 BASIC METABOLIC PNL TOTAL CA: CPT | Mod: ORL | Performed by: NURSE PRACTITIONER

## 2025-05-22 PROCEDURE — 36415 COLL VENOUS BLD VENIPUNCTURE: CPT | Mod: ORL | Performed by: NURSE PRACTITIONER

## 2025-05-22 PROCEDURE — P9604 ONE-WAY ALLOW PRORATED TRIP: HCPCS | Mod: ORL | Performed by: NURSE PRACTITIONER

## 2025-05-24 ENCOUNTER — LAB REQUISITION (OUTPATIENT)
Dept: LAB | Facility: CLINIC | Age: OVER 89
End: 2025-05-24
Payer: COMMERCIAL

## 2025-05-24 DIAGNOSIS — N39.498 OTHER SPECIFIED URINARY INCONTINENCE: ICD-10-CM

## 2025-05-24 DIAGNOSIS — Z98.2 PRESENCE OF CEREBROSPINAL FLUID DRAINAGE DEVICE: ICD-10-CM

## 2025-05-24 DIAGNOSIS — R45.1 RESTLESSNESS AND AGITATION: ICD-10-CM

## 2025-05-27 ENCOUNTER — LAB REQUISITION (OUTPATIENT)
Dept: LAB | Facility: CLINIC | Age: OVER 89
End: 2025-05-27
Payer: COMMERCIAL

## 2025-05-27 DIAGNOSIS — Z98.2 PRESENCE OF CEREBROSPINAL FLUID DRAINAGE DEVICE: ICD-10-CM

## 2025-05-27 DIAGNOSIS — N39.498 OTHER SPECIFIED URINARY INCONTINENCE: ICD-10-CM

## 2025-05-27 LAB
ALBUMIN UR-MCNC: NEGATIVE MG/DL
ANION GAP SERPL CALCULATED.3IONS-SCNC: 14 MMOL/L (ref 7–15)
APPEARANCE UR: CLEAR
BASOPHILS # BLD AUTO: 0.1 10E3/UL (ref 0–0.2)
BASOPHILS NFR BLD AUTO: 1 %
BILIRUB UR QL STRIP: NEGATIVE
BUN SERPL-MCNC: 36 MG/DL (ref 8–23)
CALCIUM SERPL-MCNC: 9.9 MG/DL (ref 8.8–10.4)
CHLORIDE SERPL-SCNC: 103 MMOL/L (ref 98–107)
COLOR UR AUTO: YELLOW
CREAT SERPL-MCNC: 1.1 MG/DL (ref 0.67–1.17)
EGFRCR SERPLBLD CKD-EPI 2021: 63 ML/MIN/1.73M2
EOSINOPHIL # BLD AUTO: 0.1 10E3/UL (ref 0–0.7)
EOSINOPHIL NFR BLD AUTO: 1 %
ERYTHROCYTE [DISTWIDTH] IN BLOOD BY AUTOMATED COUNT: 17.6 % (ref 10–15)
GLUCOSE SERPL-MCNC: 88 MG/DL (ref 70–99)
GLUCOSE UR STRIP-MCNC: NEGATIVE MG/DL
HCO3 SERPL-SCNC: 24 MMOL/L (ref 22–29)
HCT VFR BLD AUTO: 38 % (ref 40–53)
HGB BLD-MCNC: 11.8 G/DL (ref 13.3–17.7)
HGB UR QL STRIP: NEGATIVE
IMM GRANULOCYTES # BLD: 0 10E3/UL
IMM GRANULOCYTES NFR BLD: 0 %
KETONES UR STRIP-MCNC: NEGATIVE MG/DL
LEUKOCYTE ESTERASE UR QL STRIP: NEGATIVE
LYMPHOCYTES # BLD AUTO: 0.9 10E3/UL (ref 0.8–5.3)
LYMPHOCYTES NFR BLD AUTO: 14 %
MCH RBC QN AUTO: 27.3 PG (ref 26.5–33)
MCHC RBC AUTO-ENTMCNC: 31.1 G/DL (ref 31.5–36.5)
MCV RBC AUTO: 88 FL (ref 78–100)
MONOCYTES # BLD AUTO: 0.6 10E3/UL (ref 0–1.3)
MONOCYTES NFR BLD AUTO: 10 %
MUCOUS THREADS #/AREA URNS LPF: PRESENT /LPF
NEUTROPHILS # BLD AUTO: 4.8 10E3/UL (ref 1.6–8.3)
NEUTROPHILS NFR BLD AUTO: 74 %
NITRATE UR QL: NEGATIVE
NRBC # BLD AUTO: 0 10E3/UL
NRBC BLD AUTO-RTO: 0 /100
PH UR STRIP: 5.5 [PH] (ref 5–7)
PLATELET # BLD AUTO: 225 10E3/UL (ref 150–450)
POTASSIUM SERPL-SCNC: 3.6 MMOL/L (ref 3.4–5.3)
RBC # BLD AUTO: 4.32 10E6/UL (ref 4.4–5.9)
RBC URINE: <1 /HPF
SODIUM SERPL-SCNC: 141 MMOL/L (ref 135–145)
SP GR UR STRIP: 1.02 (ref 1–1.03)
UROBILINOGEN UR STRIP-MCNC: NORMAL MG/DL
WBC # BLD AUTO: 6.5 10E3/UL (ref 4–11)
WBC URINE: <1 /HPF

## 2025-05-28 ENCOUNTER — RESULTS FOLLOW-UP (OUTPATIENT)
Dept: GERIATRICS | Facility: CLINIC | Age: OVER 89
End: 2025-05-28

## 2025-05-28 ENCOUNTER — NURSING HOME VISIT (OUTPATIENT)
Dept: GERIATRICS | Facility: CLINIC | Age: OVER 89
End: 2025-05-28
Payer: COMMERCIAL

## 2025-05-28 VITALS
BODY MASS INDEX: 22.32 KG/M2 | DIASTOLIC BLOOD PRESSURE: 46 MMHG | HEART RATE: 75 BPM | TEMPERATURE: 97.9 F | RESPIRATION RATE: 18 BRPM | OXYGEN SATURATION: 96 % | WEIGHT: 126 LBS | HEIGHT: 63 IN | SYSTOLIC BLOOD PRESSURE: 102 MMHG

## 2025-05-28 DIAGNOSIS — R41.82 ALTERED MENTAL STATUS, UNSPECIFIED ALTERED MENTAL STATUS TYPE: Primary | ICD-10-CM

## 2025-05-28 DIAGNOSIS — N40.1 BENIGN PROSTATIC HYPERPLASIA WITH WEAK URINARY STREAM: ICD-10-CM

## 2025-05-28 DIAGNOSIS — R39.12 BENIGN PROSTATIC HYPERPLASIA WITH WEAK URINARY STREAM: ICD-10-CM

## 2025-05-28 DIAGNOSIS — R32 URINARY INCONTINENCE, UNSPECIFIED TYPE: ICD-10-CM

## 2025-05-28 LAB — BACTERIA UR CULT: NORMAL

## 2025-05-28 PROCEDURE — 99309 SBSQ NF CARE MODERATE MDM 30: CPT | Performed by: NURSE PRACTITIONER

## 2025-05-28 NOTE — LETTER
" 5/28/2025      Bc Seth  [address]  [facility] [address]        M [facility] GERIATRIC SERVICES  Acute visit    Chief Complaint   Patient presents with     RECHECK     HPI:  Bc Seth is a 89 year old  (7/9/1932), who is being seen today for an episodic care visit at: [facility] () [[id number]]. Today's concern is:      Altered mental status, unspecified altered mental status type  Benign prostatic hyperplasia with weak urinary stream  Urinary incontinence, unspecified type    Over the weekend the nurse had requested a UA/UC and labs due to patients confusion and urinary incontinence. Per todays staff nurse he is normally intermittently incontinent of urine. Per the nurse manager he is back to his baseline and they have no concerns.  Per nursing home EHR review including MAR< progress notes, vital signs no concerns.         Labs done yesterday and not concerning.   ROS:  Denies pain, dysuria and frequency.     Vitals:  /46   Pulse 75   Temp 97.9  F (36.6  C)   Resp 18   Ht 1.6 m (5' 3\")   Wt 57.2 kg (126 lb)   SpO2 96%   BMI 22.32 kg/m    Exam:  General: alert. In no distress. Ambulating about in the hallways.     ASSESSMENT/PLAN:    ICD-10-CM    1. Altered mental status, unspecified altered mental status type  R41.82       2. Benign prostatic hyperplasia with weak urinary stream  N40.1     R39.12       3. Urinary incontinence, unspecified type  R32          Christopher's criteria.   Without catheter  Acute dysuria (painful urination)  or    Fever >100 F (>37.9 C) or 2.4 F  (1.5 C) increase above baseline  and at least one of the following:  New or worsening:    Suprapubic pain (pain over the  bladder)    Urinary frequency or urgency    Urinary incontinence    Gross hematuria (blood in the  urine)    Costovertebral angle (CVA)  tenderness (flank pain)    Orders:  No changes.       Electronically signed by: Kelsy Tavera NP       Sincerely,        Kelsy Tavera NP    Electronically " signed

## 2025-05-29 NOTE — PROGRESS NOTES
"[facility] GERIATRIC SERVICES  Acute visit    Chief Complaint   Patient presents with    RECHECK     HPI:  Bc Seth is a 89 year old  (7/9/1932), who is being seen today for an episodic care visit at: [facility] () [[id number]]. Today's concern is:      Altered mental status, unspecified altered mental status type  Benign prostatic hyperplasia with weak urinary stream  Urinary incontinence, unspecified type    Over the weekend the nurse had requested a UA/UC and labs due to patients confusion and urinary incontinence. Per todays staff nurse he is normally intermittently incontinent of urine. Per the nurse manager he is back to his baseline and they have no concerns.  Per nursing home EHR review including MAR< progress notes, vital signs no concerns.         Labs done yesterday and not concerning.   ROS:  Denies pain, dysuria and frequency.     Vitals:  /46   Pulse 75   Temp 97.9  F (36.6  C)   Resp 18   Ht 1.6 m (5' 3\")   Wt 57.2 kg (126 lb)   SpO2 96%   BMI 22.32 kg/m    Exam:  General: alert. In no distress. Ambulating about in the hallways.     ASSESSMENT/PLAN:    ICD-10-CM    1. Altered mental status, unspecified altered mental status type  R41.82       2. Benign prostatic hyperplasia with weak urinary stream  N40.1     R39.12       3. Urinary incontinence, unspecified type  R32          Christopher's criteria.   Without catheter  Acute dysuria (painful urination)  or   Fever >100 F (>37.9 C) or 2.4 F  (1.5 C) increase above baseline  and at least one of the following:  New or worsening:   Suprapubic pain (pain over the  bladder)   Urinary frequency or urgency   Urinary incontinence   Gross hematuria (blood in the  urine)   Costovertebral angle (CVA)  tenderness (flank pain)    Orders:  No changes.       Electronically signed by: Kelsy Tavera NP     "

## 2025-07-03 NOTE — PROGRESS NOTES
[facility] GERIATRICS  Chief Complaint   Patient presents with    [facility] Medical Record Number:  [id number]  Place of Service where encounter took place:  [facility] () [[id number]]    HPI:    Bc Seth  is 89 year old (7/9/1932), who is being seen today for a federally mandated E/M visit. Today's concerns are:  {FGS DX:890057}    ALLERGIES:Patient has no known allergies.  PAST MEDICAL HISTORY:   Past Medical History:   Diagnosis Date    Aspiration pneumonia of right lower lobe (H) 05/13/2024     PAST SURGICAL HISTORY:   has a past surgical history that includes IR Abdominal Aortogram (11/11/2023).  FAMILY HISTORY: family history is not on file.  SOCIAL HISTORY:      MEDICATIONS:  MED REC REQUIRED{TIP  Click the link below to document or use med rec list, use list to pull in response :478469}  Post Medication Reconciliation Status: {MED REC LIST:627426}         Review of your medicines            Accurate as of July 3, 2025  3:40 PM. If you have any questions, ask your nurse or doctor.                CONTINUE these medicines which have NOT CHANGED        Dose / Directions   clopidogrel 75 MG tablet  Commonly known as: PLAVIX      Dose: 75 mg  Take 75 mg by mouth daily  Refills: 0     ferrous gluconate 324 (38 Fe) MG tablet  Commonly known as: FERGON  Indication: Anemia From Inadequate Iron in the Body      Dose: 324 mg  Take 324 mg by mouth every other day.  Refills: 0     finasteride 5 MG tablet  Commonly known as: PROSCAR  Indication: Benign Enlargement of Prostate      Dose: 5 mg  Take 5 mg by mouth daily  Refills: 0     lisinopril 5 MG tablet  Commonly known as: ZESTRIL  Indication: High Blood Pressure      Dose: 10 mg  Take 10 mg by mouth daily.  Refills: 0     omeprazole 20 MG EC tablet  Commonly known as: PriLOSEC OTC  Indication: GERD      Dose: 20 mg  Take 20 mg by mouth daily.  Refills: 0     tamsulosin 0.4 MG capsule  Commonly known as:  FLOMAX  Indication: Benign Enlargement of Prostate      Dose: 0.8 mg  Take 0.8 mg by mouth daily  Refills: 0           ***    Case Management:  I have reviewed the care plan and MDS and do agree with the plan. Patient's desire to return to the community is {FGS RETURN TO COMMUNITY:178203}. Information reviewed:  Medications, vital signs, orders, and nursing notes.    ROS:  {ROS FGS:901977}    Vitals:  There were no vitals taken for this visit.  There is no height or weight on file to calculate BMI.  Exam:  {long-term physical exam :396272}    Lab/Diagnostic data:   {fgslab:465229}    ASSESSMENT/PLAN  {FGS DX2:582098}    {fgsorders:034324}  ***    Electronically signed by:  Stephanie Harris***

## 2025-07-07 ENCOUNTER — NURSING HOME VISIT (OUTPATIENT)
Dept: GERIATRICS | Facility: CLINIC | Age: OVER 89
End: 2025-07-07
Payer: COMMERCIAL

## 2025-07-07 VITALS
TEMPERATURE: 98.2 F | DIASTOLIC BLOOD PRESSURE: 84 MMHG | HEART RATE: 72 BPM | SYSTOLIC BLOOD PRESSURE: 148 MMHG | HEIGHT: 63 IN | WEIGHT: 130.8 LBS | OXYGEN SATURATION: 92 % | BODY MASS INDEX: 23.18 KG/M2 | RESPIRATION RATE: 16 BRPM

## 2025-07-07 DIAGNOSIS — K59.01 SLOW TRANSIT CONSTIPATION: ICD-10-CM

## 2025-07-07 DIAGNOSIS — I73.9 PERIPHERAL VASCULAR DISEASE: ICD-10-CM

## 2025-07-07 DIAGNOSIS — I10 ESSENTIAL HYPERTENSION: ICD-10-CM

## 2025-07-07 DIAGNOSIS — D50.9 IRON DEFICIENCY ANEMIA, UNSPECIFIED IRON DEFICIENCY ANEMIA TYPE: Primary | ICD-10-CM

## 2025-07-07 DIAGNOSIS — N40.1 BENIGN PROSTATIC HYPERPLASIA WITH WEAK URINARY STREAM: ICD-10-CM

## 2025-07-07 DIAGNOSIS — C78.7 CHOLANGIOCARCINOMA METASTATIC TO LIVER (H): ICD-10-CM

## 2025-07-07 DIAGNOSIS — M62.81 GENERALIZED MUSCLE WEAKNESS: ICD-10-CM

## 2025-07-07 DIAGNOSIS — C22.1 CHOLANGIOCARCINOMA METASTATIC TO LIVER (H): ICD-10-CM

## 2025-07-07 DIAGNOSIS — R39.12 BENIGN PROSTATIC HYPERPLASIA WITH WEAK URINARY STREAM: ICD-10-CM

## 2025-07-07 NOTE — LETTER
7/7/2025      Bc Seth  [address]  [facility] [address]        [facility] GERIATRICS  Chief Complaint   Patient presents with    [facility] Medical Record Number:  [id number]  Place of Service where encounter took place:  [facility] () [[id number]]    HPI:    Bc Seth  is 89 year old (7/9/1932), who is being seen today for a federally mandated E/M visit. Today's concerns are:  {FGS DX:683339}    ALLERGIES:Patient has no known allergies.  PAST MEDICAL HISTORY:   Past Medical History:   Diagnosis Date    Aspiration pneumonia of right lower lobe (H) 05/13/2024     PAST SURGICAL HISTORY:   has a past surgical history that includes IR Abdominal Aortogram (11/11/2023).  FAMILY HISTORY: family history is not on file.  SOCIAL HISTORY:      MEDICATIONS:  MED REC REQUIRED{TIP  Click the link below to document or use med rec list, use list to pull in response :[id number]}  Post Medication Reconciliation Status: {MED REC LIST:426183}         Review of your medicines            Accurate as of July 3, 2025  3:40 PM. If you have any questions, ask your nurse or doctor.                CONTINUE these medicines which have NOT CHANGED        Dose / Directions   clopidogrel 75 MG tablet  Commonly known as: PLAVIX      Dose: 75 mg  Take 75 mg by mouth daily  Refills: 0     ferrous gluconate 324 (38 Fe) MG tablet  Commonly known as: FERGON  Indication: Anemia From Inadequate Iron in the Body      Dose: 324 mg  Take 324 mg by mouth every other day.  Refills: 0     finasteride 5 MG tablet  Commonly known as: PROSCAR  Indication: Benign Enlargement of Prostate      Dose: 5 mg  Take 5 mg by mouth daily  Refills: 0     lisinopril 5 MG tablet  Commonly known as: ZESTRIL  Indication: High Blood Pressure      Dose: 10 mg  Take 10 mg by mouth daily.  Refills: 0     omeprazole 20 MG EC tablet  Commonly known as: PriLOSEC OTC  Indication: GERD      Dose: 20 mg  Take 20 mg by mouth  daily.  Refills: 0     tamsulosin 0.4 MG capsule  Commonly known as: FLOMAX  Indication: Benign Enlargement of Prostate      Dose: 0.8 mg  Take 0.8 mg by mouth daily  Refills: 0           ***    Case Management:  I have reviewed the care plan and MDS and do agree with the plan. Patient's desire to return to the community is {FGS RETURN TO COMMUNITY:530392}. Information reviewed:  Medications, vital signs, orders, and nursing notes.    ROS:  {ROS FGS:843130}    Vitals:  There were no vitals taken for this visit.  There is no height or weight on file to calculate BMI.  Exam:  {senior care physical exam :552789}    Lab/Diagnostic data:   {fgslab:267205}    ASSESSMENT/PLAN  {FGS DX2:727133}    {fgsorders:324074}  ***    Electronically signed by:  Stephanie Harris***          Sincerely,        Gracia Khan MD    Electronically signed

## 2025-07-28 ENCOUNTER — NURSING HOME VISIT (OUTPATIENT)
Dept: GERIATRICS | Facility: CLINIC | Age: OVER 89
End: 2025-07-28
Payer: COMMERCIAL

## 2025-07-28 VITALS
BODY MASS INDEX: 22.71 KG/M2 | RESPIRATION RATE: 16 BRPM | WEIGHT: 128.2 LBS | DIASTOLIC BLOOD PRESSURE: 72 MMHG | TEMPERATURE: 98.1 F | SYSTOLIC BLOOD PRESSURE: 121 MMHG | HEIGHT: 63 IN | OXYGEN SATURATION: 91 % | HEART RATE: 65 BPM

## 2025-07-28 DIAGNOSIS — J18.9 PNEUMONIA DUE TO INFECTIOUS ORGANISM, UNSPECIFIED LATERALITY, UNSPECIFIED PART OF LUNG: Primary | ICD-10-CM

## 2025-07-28 DIAGNOSIS — R13.13 DYSPHAGIA, CRICOPHARYNGEAL: ICD-10-CM

## 2025-07-28 DIAGNOSIS — J44.9 CHRONIC OBSTRUCTIVE PULMONARY DISEASE, UNSPECIFIED COPD TYPE (H): ICD-10-CM

## 2025-07-28 PROCEDURE — 99309 SBSQ NF CARE MODERATE MDM 30: CPT | Performed by: NURSE PRACTITIONER

## 2025-07-28 NOTE — LETTER
" 7/28/2025      Bc Seth  [address]  [facility] [address]        [facility]  Acute visit    Chief Complaint   Patient presents with     RECHECK     HPI:  Bc Seth is a 89 year old  (7/9/1932), who is being seen today for an episodic care visit at: [facility] () [[id number]]. Today's concern is:      Pneumonia due to infectious organism, unspecified laterality, unspecified part of lung  Dysphagia, cricopharyngeal  Chronic obstructive pulmonary disease, unspecified COPD type (H)    Patient with known history of dysphagia and COPD. With new onset last week increased weakness and audible chest congestion. He has had a fall x 2 this weekend. He was started on abx on Friday.     Today I spoke with his [facility] yoni who has concerns that he may have fallen and hit his head and a scan of his head wasn't down. Offered to write order for him to be sent in have a CT scan of the head. Discussed that nursing does a neuro assessment per protocol and will alert if there are concerns. She declined head CT scan.     Her biggest concern is that he is not walking enough. Usually when I see him in the hallway he is walking about the unit     Xray came back negative for pneumonia. Will continue with the abx and [facility] is please about that.    ROS:  Reviewed with [facility] and nursing staff due to non english speaking and cognitive impairment.     Vitals:  /72   Pulse 65   Temp 98.1  F (36.7  C)   Resp 16   Ht 1.6 m (5' 3\")   Wt 58.2 kg (128 lb 3.2 oz)   SpO2 (!) 91%   BMI 22.71 kg/m    Exam:  General: alert. In no distress. Sitting on the side of the bed in his room. Lungs are clear. No chest congestion.     ASSESSMENT/PLAN:    ICD-10-CM    1. Pneumonia due to infectious organism, unspecified laterality, unspecified part of lung  J18.9 Improving. Complete course of augment, mucinex and neb.       2. Dysphagia, cricopharyngeal  R13.13 Stable. Continues to be at risk for aspiration.     "   3. Chronic obstructive pulmonary disease, unspecified COPD type (H)  J44.9 Improving continue treatment for pneumonia above.                  Electronically signed by: Kelsy Tavera NP       Sincerely,        Kelsy Tavera NP    Electronically signed

## 2025-07-29 NOTE — PROGRESS NOTES
"[facility] GERIATRIC SERVICES  Acute visit    Chief Complaint   Patient presents with    RECHECK     HPI:  Bc Seth is a 89 year old  (7/9/1932), who is being seen today for an episodic care visit at: [facility] () [[id number]]. Today's concern is:      Pneumonia due to infectious organism, unspecified laterality, unspecified part of lung  Dysphagia, cricopharyngeal  Chronic obstructive pulmonary disease, unspecified COPD type (H)    Patient with known history of dysphagia and COPD. With new onset last week increased weakness and audible chest congestion. He has had a fall x 2 this weekend. He was started on abx on Friday.     Today I spoke with his [facility] yoni who has concerns that he may have fallen and hit his head and a scan of his head wasn't down. Offered to write order for him to be sent in have a CT scan of the head. Discussed that nursing does a neuro assessment per protocol and will alert if there are concerns. She declined head CT scan.     Her biggest concern is that he is not walking enough. Usually when I see him in the hallway he is walking about the unit     Xray came back negative for pneumonia. Will continue with the abx and [facility] is please about that.    ROS:  Reviewed with [facility] and nursing staff due to non english speaking and cognitive impairment.     Vitals:  /72   Pulse 65   Temp 98.1  F (36.7  C)   Resp 16   Ht 1.6 m (5' 3\")   Wt 58.2 kg (128 lb 3.2 oz)   SpO2 (!) 91%   BMI 22.71 kg/m    Exam:  General: alert. In no distress. Sitting on the side of the bed in his room. Lungs are clear. No chest congestion.     ASSESSMENT/PLAN:    ICD-10-CM    1. Pneumonia due to infectious organism, unspecified laterality, unspecified part of lung  J18.9 Improving. Complete course of augment, mucinex and neb.       2. Dysphagia, cricopharyngeal  R13.13 Stable. Continues to be at risk for aspiration.       3. Chronic obstructive pulmonary disease, unspecified COPD type (H)  J44.9 " Improving continue treatment for pneumonia above.                  Electronically signed by: Kelsy Tavera NP

## 2025-08-04 ENCOUNTER — NURSING HOME VISIT (OUTPATIENT)
Dept: GERIATRICS | Facility: CLINIC | Age: OVER 89
End: 2025-08-04
Payer: COMMERCIAL

## 2025-08-04 VITALS
BODY MASS INDEX: 22.5 KG/M2 | SYSTOLIC BLOOD PRESSURE: 98 MMHG | TEMPERATURE: 97.9 F | OXYGEN SATURATION: 95 % | RESPIRATION RATE: 16 BRPM | DIASTOLIC BLOOD PRESSURE: 58 MMHG | WEIGHT: 127 LBS | HEIGHT: 63 IN | HEART RATE: 68 BPM

## 2025-08-04 DIAGNOSIS — M62.81 GENERALIZED MUSCLE WEAKNESS: ICD-10-CM

## 2025-08-04 DIAGNOSIS — J44.9 CHRONIC OBSTRUCTIVE PULMONARY DISEASE, UNSPECIFIED COPD TYPE (H): Primary | ICD-10-CM
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
## 2025-08-04 DIAGNOSIS — C78.7 CHOLANGIOCARCINOMA METASTATIC TO LIVER (H): ICD-10-CM

## 2025-08-04 DIAGNOSIS — C22.1 CHOLANGIOCARCINOMA METASTATIC TO LIVER (H): ICD-10-CM

## 2025-08-04 DIAGNOSIS — R62.7 ADULT FAILURE TO THRIVE: ICD-10-CM

## 2025-08-04 PROCEDURE — 99309 SBSQ NF CARE MODERATE MDM 30: CPT | Performed by: FAMILY MEDICINE

## 2025-08-28 ENCOUNTER — NURSING HOME VISIT (OUTPATIENT)
Dept: GERIATRICS | Facility: CLINIC | Age: OVER 89
End: 2025-08-28
Payer: COMMERCIAL

## 2025-08-28 VITALS
BODY MASS INDEX: 22.5 KG/M2 | HEART RATE: 68 BPM | OXYGEN SATURATION: 94 % | TEMPERATURE: 98.1 F | HEIGHT: 63 IN | DIASTOLIC BLOOD PRESSURE: 73 MMHG | SYSTOLIC BLOOD PRESSURE: 127 MMHG | RESPIRATION RATE: 16 BRPM

## 2025-08-28 DIAGNOSIS — F32.0 CURRENT MILD EPISODE OF MAJOR DEPRESSIVE DISORDER WITHOUT PRIOR EPISODE: Primary | ICD-10-CM

## 2025-09-03 ENCOUNTER — NURSING HOME VISIT (OUTPATIENT)
Dept: GERIATRICS | Facility: CLINIC | Age: OVER 89
End: 2025-09-03
Payer: COMMERCIAL

## 2025-09-03 VITALS
HEIGHT: 63 IN | HEART RATE: 61 BPM | BODY MASS INDEX: 22.57 KG/M2 | TEMPERATURE: 98.1 F | OXYGEN SATURATION: 95 % | WEIGHT: 127.4 LBS | SYSTOLIC BLOOD PRESSURE: 113 MMHG | RESPIRATION RATE: 18 BRPM | DIASTOLIC BLOOD PRESSURE: 76 MMHG

## 2025-09-03 DIAGNOSIS — R13.13 DYSPHAGIA, CRICOPHARYNGEAL: ICD-10-CM

## 2025-09-03 DIAGNOSIS — R05.1 ACUTE COUGH: Primary | ICD-10-CM

## 2025-09-03 DIAGNOSIS — F32.0 CURRENT MILD EPISODE OF MAJOR DEPRESSIVE DISORDER WITHOUT PRIOR EPISODE: ICD-10-CM

## 2025-09-03 DIAGNOSIS — I10 ESSENTIAL HYPERTENSION: ICD-10-CM

## 2025-09-03 DIAGNOSIS — J44.9 CHRONIC OBSTRUCTIVE PULMONARY DISEASE, UNSPECIFIED COPD TYPE (H): ICD-10-CM

## 2025-09-03 DIAGNOSIS — I73.9 PERIPHERAL VASCULAR DISEASE: ICD-10-CM

## 2025-09-03 DIAGNOSIS — I48.0 PAROXYSMAL ATRIAL FIBRILLATION (H): ICD-10-CM

## 2025-09-03 PROCEDURE — 99309 SBSQ NF CARE MODERATE MDM 30: CPT | Performed by: NURSE PRACTITIONER

## 2025-09-03 RX ORDER — HYDROCHLOROTHIAZIDE 12.5 MG/1
12.5 TABLET ORAL DAILY
COMMUNITY

## 2025-09-03 RX ORDER — CITALOPRAM HYDROBROMIDE 10 MG/1
10 TABLET ORAL DAILY
COMMUNITY

## 2025-09-04 ENCOUNTER — TELEPHONE (OUTPATIENT)
Dept: GERIATRICS | Facility: CLINIC | Age: OVER 89
End: 2025-09-04
Payer: COMMERCIAL

## 2025-09-04 RX ORDER — IPRATROPIUM BROMIDE AND ALBUTEROL SULFATE 2.5; .5 MG/3ML; MG/3ML
1 SOLUTION RESPIRATORY (INHALATION) 3 TIMES DAILY
COMMUNITY